# Patient Record
Sex: MALE | Race: WHITE | NOT HISPANIC OR LATINO | Employment: UNEMPLOYED | URBAN - METROPOLITAN AREA
[De-identification: names, ages, dates, MRNs, and addresses within clinical notes are randomized per-mention and may not be internally consistent; named-entity substitution may affect disease eponyms.]

---

## 2017-02-05 ENCOUNTER — HOSPITAL ENCOUNTER (EMERGENCY)
Facility: HOSPITAL | Age: 42
Discharge: HOME | End: 2017-02-05
Attending: EMERGENCY MEDICINE
Payer: COMMERCIAL

## 2017-02-05 VITALS
SYSTOLIC BLOOD PRESSURE: 141 MMHG | DIASTOLIC BLOOD PRESSURE: 94 MMHG | WEIGHT: 152 LBS | HEART RATE: 104 BPM | RESPIRATION RATE: 18 BRPM | HEIGHT: 68 IN | OXYGEN SATURATION: 98 % | TEMPERATURE: 99 F | BODY MASS INDEX: 23.04 KG/M2

## 2017-02-05 DIAGNOSIS — T22.132A: Primary | ICD-10-CM

## 2017-02-05 DIAGNOSIS — T22.232A: ICD-10-CM

## 2017-02-05 PROCEDURE — 25000003 PHARM REV CODE 250: Performed by: EMERGENCY MEDICINE

## 2017-02-05 PROCEDURE — 99283 EMERGENCY DEPT VISIT LOW MDM: CPT

## 2017-02-05 RX ORDER — IBUPROFEN 400 MG/1
800 TABLET ORAL
Status: COMPLETED | OUTPATIENT
Start: 2017-02-05 | End: 2017-02-05

## 2017-02-05 RX ADMIN — IBUPROFEN 800 MG: 400 TABLET, FILM COATED ORAL at 04:02

## 2017-02-05 NOTE — ED TRIAGE NOTES
"Pt arrives to ED via personal transportation with c/o burn to left arm and left hand pta, states "i was cooking a roast before i got here and the grease on the roast burned my left arm and left hand." Denies any other symptoms at this time.  "

## 2017-02-05 NOTE — ED AVS SNAPSHOT
[facility]  [place] [address]               Polo Jones   2017  4:27 PM   ED    Description:  Male : 1975   Department:  [facility]           Your Care was Coordinated By:     Provider Role From To    Gianluca Diaz MD Attending Provider 17 [id number] --      Reason for Visit     Burn           Diagnoses this Visit        Comments    Superficial burn of left upper arm    -  Primary     Partial thickness burn of left upper arm           ED Disposition     None           To Do List           Follow-up Information     Follow up with [facility]. Schedule an appointment as soon as possible for a visit in 1 week.    Why:  For wound re-check    Contact information:    [address] [facility] [address]  [phone]          Follow up with [facility].    Specialty:  Emergency Medicine    Why:  If symptoms worsen    Contact information:    [place] [id number] [phone]      [facility]     [facility] Nurse Care Line -  Assistance  Registered nurses in the [facility] provide clinical advisement, health education, appointment booking, and other advisory services.  Call for this free service at [phone].             Medications           Message regarding Medications     Verify the changes and/or additions to your medication regime listed below are the same as discussed with your clinician today.  If any of these changes or additions are incorrect, please notify your healthcare provider.        These medications were administered today        Dose Freq    ibuprofen tablet 800 mg 800 mg ED 1 Time    Sig: Take 2 tablets (800 mg total) by mouth ED 1 Time.    Class: Normal    Route: Oral           Verify that the below list of medications is an accurate representation of the medications you are currently taking.  If none reported, the  "list may be blank. If incorrect, please contact your healthcare provider. Carry this list with you in case of emergency.           Current Medications     ibuprofen tablet 800 mg Take 2 tablets (800 mg total) by mouth ED 1 Time.           Clinical Reference Information           Your Vitals Were     BP Pulse Temp Resp Height Weight    141/94 (BP Location: Right arm) 104 98.5 °F (36.9 °C) (Oral) 18 5' 8" (1.727 m) 68.9 kg (152 lb)    SpO2 BMI             98% 23.11 kg/m2         Allergies as of 2/5/2017     No Known Allergies      Immunizations Administered on Date of Encounter - 2/5/2017     None      ED Micro, Lab, POCT     None      ED Imaging Orders     None      Discharge References/Attachments     BURN, FIRST-DEGREE (ENGLISH)    BURN, SECOND-DEGREE (ENGLISH)      [facility] Sign-Up     Activating your [facility] account is as easy as 1-2-3!     1) Visit [email or web address], select Sign Up Now, enter this activation code and your date of birth, then select Next.  [email or web address]  Expires: 3/22/2017  4:36 PM      2) Create a username and password to use when you visit [facility] in the future and select a security question in case you lose your password and select Next.    3) Enter your e-mail address and click Sign Up!    Additional Information  If you have questions, please e-mail [email or web address] or call [phone] to talk to our [facility] staff. Remember, [facility] is NOT to be used for urgent needs. For medical emergencies, dial 911.          [facility] complies with applicable Federal civil rights laws and does not discriminate on the basis of race, color, national origin, age, disability, or sex.        Language Assistance Services     ATTENTION: Language assistance services are available, free of charge. Please call [phone].      ATENCIÓN: Si habla español, tiene a kelly disposición servicios gratuitos de asistencia lingüística. Llame al [phone].     CHÚ Ý: N?u b?n nói Ti?ng " Vi?t, có các d?ch v? h? tr? ngôn ng? mi?n phí dành cho b?n. G?i s? [phone].

## 2017-02-05 NOTE — ED PROVIDER NOTES
Encounter Date: 2/5/2017       History     Chief Complaint   Patient presents with    Burn     Pt reports buring left arm while cooking with hot grease.      Review of patient's allergies indicates:  No Known Allergies  HPI Comments: Patient presents immediately after a cooking accident.  Reports he dripped grease from a pot roast onto his left arm.  Reports he has burns from just below the shoulder extending down to his wrist.  Mostly on the volar aspect.  No loss of sensation.  It is painful.  He has noticed some blisters forming.  Reports his tetanus up-to-date from 2012.  Denies any other burns injuries or complaints.    The history is provided by the patient.     History reviewed. No pertinent past medical history.  No past medical history pertinent negatives.  History reviewed. No pertinent past surgical history.  History reviewed. No pertinent family history.  Social History   Substance Use Topics    Smoking status: Never Smoker    Smokeless tobacco: None    Alcohol use No     Review of Systems   Constitutional: Negative for activity change, appetite change, chills, fatigue and fever.   HENT: Negative for congestion, facial swelling, mouth sores, sore throat, tinnitus, trouble swallowing and voice change.    Eyes: Negative for pain, discharge and redness.   Respiratory: Negative for cough, chest tightness, shortness of breath and wheezing.    Cardiovascular: Negative for chest pain, palpitations and leg swelling.   Gastrointestinal: Negative for abdominal pain, diarrhea, nausea and vomiting.   Endocrine: Negative for polydipsia, polyphagia and polyuria.   Genitourinary: Negative for dysuria.   Musculoskeletal: Negative for back pain, gait problem, myalgias, neck pain and neck stiffness.   Skin: Positive for wound. Negative for rash.   Neurological: Negative for seizures, weakness, light-headedness and headaches.   Hematological: Does not bruise/bleed easily.   All other systems reviewed and are  negative.      Physical Exam   Initial Vitals   BP Pulse Resp Temp SpO2   02/05/17 1623 02/05/17 1623 02/05/17 1623 02/05/17 1623 02/05/17 1623   141/94 104 18 98.5 °F (36.9 °C) 98 %     Physical Exam    Nursing note and vitals reviewed.  Constitutional: He appears well-developed and well-nourished. He is not diaphoretic.   HENT:   Head: Normocephalic and atraumatic.   Right Ear: External ear normal.   Left Ear: External ear normal.   Nose: Nose normal.   Mouth/Throat: Oropharynx is clear and moist.   Eyes: Conjunctivae and EOM are normal. Pupils are equal, round, and reactive to light. Right eye exhibits no discharge. Left eye exhibits no discharge. No scleral icterus.   Neck: Normal range of motion.   Cardiovascular: Normal rate, regular rhythm, normal heart sounds and intact distal pulses.   No murmur heard.  Pulmonary/Chest: Breath sounds normal. No stridor. No respiratory distress. He has no wheezes. He has no rhonchi. He has no rales. He exhibits no tenderness.   Abdominal: Soft. He exhibits no distension. There is no tenderness. There is no rebound and no guarding.   Musculoskeletal: Normal range of motion. He exhibits no edema or tenderness.   Lymphadenopathy:     He has no cervical adenopathy.   Neurological: He is alert and oriented to person, place, and time. He has normal strength.   Skin: Skin is warm. Burn noted.        Psychiatric: He has a normal mood and affect. His behavior is normal. Judgment and thought content normal.         ED Course   Procedures  Labs Reviewed - No data to display          Medical Decision Making:   ED Management:  Well-appearing patient presents less than 30 minutes after sustaining a burn while cooking.  This does appear to be primarily superficial burn.  There are a few scattered blisters forming.  Good pink skin throughout.  None of it is insensate.  No circumferential burn.  Wrapped in gauze wetted for cooling.  Counseled to keep cleaned.  Tetanus is up-to-date.   Ibuprofen for pain.  Follow-up with primary care in 1 week for wound recheck.    KATELIN Diaz M.D.  02/05/2017  4:56 PM                     ED Course     Clinical Impression:   The primary encounter diagnosis was Superficial burn of left upper arm. A diagnosis of Partial thickness burn of left upper arm was also pertinent to this visit.          Gianluca Diaz MD  02/05/17 7835

## 2017-04-13 ENCOUNTER — OFFICE VISIT (OUTPATIENT)
Dept: INFECTIOUS DISEASES | Facility: CLINIC | Age: 42
End: 2017-04-13
Payer: COMMERCIAL

## 2017-04-13 ENCOUNTER — HOSPITAL ENCOUNTER (OUTPATIENT)
Dept: RADIOLOGY | Facility: HOSPITAL | Age: 42
Discharge: HOME | End: 2017-04-13
Attending: INTERNAL MEDICINE
Payer: COMMERCIAL

## 2017-04-13 VITALS
DIASTOLIC BLOOD PRESSURE: 96 MMHG | HEIGHT: 68 IN | SYSTOLIC BLOOD PRESSURE: 143 MMHG | HEART RATE: 90 BPM | TEMPERATURE: 98 F | WEIGHT: 151 LBS | BODY MASS INDEX: 22.88 KG/M2

## 2017-04-13 DIAGNOSIS — R06.00 DYSPNEA, UNSPECIFIED TYPE: ICD-10-CM
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
## 2017-04-13 DIAGNOSIS — S90.869S: Primary | ICD-10-CM

## 2017-04-13 PROCEDURE — 99205 OFFICE O/P NEW HI 60 MIN: CPT | Mod: S$GLB,,, | Performed by: INTERNAL MEDICINE

## 2017-04-13 PROCEDURE — 71020 XR CHEST PA AND LATERAL: CPT | Mod: 26,,, | Performed by: RADIOLOGY

## 2017-04-13 PROCEDURE — 71020 XR CHEST PA AND LATERAL: CPT | Mod: TC

## 2017-04-13 PROCEDURE — 99999 PR PBB SHADOW E&M-EST. PATIENT-LVL III: CPT | Mod: PBBFAC,,, | Performed by: INTERNAL MEDICINE

## 2017-04-13 NOTE — PROGRESS NOTES
"Subjective:      Patient ID: Polo Jones is a 41 y.o. male.    Chief Complaint:   Pt self referred for concern about Lyme disease      History of Present Illness    2013 had tick bite on foot while in [place]. He removed the ticks which were about 2-3 mm in diameter. About 3 mos later noted red rash on chest which persisted for 2 years and resolved spontaneously. He first saw his doctor (Gayathri Guerin)  in Aug 2013.  He requested [facility] lab test 10/21/13 for Lyme. Results:      IFA B burgdorferi G/A/M which was "equivocal". Western blot showed positive bands for 39 and 41 (also indeterminate)    Had [facility] tests done 12/4/13 showing normal / neg CMP, CBC, HBsAg, RPR, Lyme ab (IgG/IgM, babesia panel and hep C ab.    He says he has food allergies it combined eggs, milk and nuts which if eaten together causes wheezing, but not when eaten separately.  Also reports burn on his left forearm which he treated with topical silver sulfadiazine and worried that this may have hurt his immune system.    Other symptoms he has presently include tachycardia, chronic dyspnea (non-smoker, teen asthma hx) and a large right inguinal hernia. He recently completed an echocardiogram and Holter monitor, but they have not been resulted yet (Dr. Craig). No rash presently.    Review of Systems   Constitution: Negative for chills, decreased appetite, fever, weakness, malaise/fatigue, night sweats, weight gain and weight loss.   HENT: Negative for congestion, ear pain, headaches, hearing loss, hoarse voice, sore throat and tinnitus.    Eyes: Negative for blurred vision, redness and visual disturbance.   Cardiovascular: Positive for palpitations. Negative for chest pain and leg swelling.   Respiratory: Positive for shortness of breath. Negative for cough, hemoptysis and sputum production.    Hematologic/Lymphatic: Negative for adenopathy. Does not bruise/bleed easily.   Skin: Negative for dry skin, itching, rash and suspicious lesions. "   Musculoskeletal: Negative for back pain, joint pain, myalgias and neck pain.   Gastrointestinal: Negative for abdominal pain, constipation, diarrhea, heartburn, nausea and vomiting.   Genitourinary: Negative for dysuria, flank pain, frequency, hematuria, hesitancy and urgency.   Neurological: Negative for dizziness, numbness and paresthesias.   Psychiatric/Behavioral: Negative for depression and memory loss. The patient does not have insomnia and is not nervous/anxious.      Objective:   Physical Exam   Constitutional: He is oriented to person, place, and time. He appears well-developed and well-nourished.   HENT:   Head: Normocephalic and atraumatic.   Right Ear: External ear normal.   Left Ear: External ear normal.   Mouth/Throat: Oropharynx is clear and moist.   Eyes: Conjunctivae and EOM are normal. Pupils are equal, round, and reactive to light.   Neck: Normal range of motion. Neck supple. No thyromegaly present.   Cardiovascular: Normal rate, regular rhythm and normal heart sounds.    No murmur heard.  Pulmonary/Chest: Effort normal and breath sounds normal. He has no wheezes. He has no rales.   Abdominal: Soft. Bowel sounds are normal. He exhibits no mass. There is no tenderness. There is no rebound. A hernia is present.   Large right IH   Musculoskeletal: Normal range of motion.   Lymphadenopathy:     He has no cervical adenopathy.   No lymphadenopathy neck, axillae and inguinal   Neurological: He is alert and oriented to person, place, and time.   Skin: Skin is warm and dry.   Psychiatric:   Peculiar affect   Vitals reviewed.    Assessment:       1. Tick bite of foot, 2013 with indeterminate Lyme testing; clinical scenario not suggestive of Lyme disease   2. Dyspnea, unspecified type      3.   Palpitations     Plan:        1. Labs and related serologies ordered   2. Obtain echo and Holter results   3. Phone review after labs available

## 2017-04-13 NOTE — MR AVS SNAPSHOT
[facility] - Infectious Diseases  [address]  [facility] [address]  Phone: [phone]  Fax: [phone]                  Polo Soloriojohnna   2017 3:30 PM   Office Visit    Description:  Male : 1975   Provider:  Polo Biggs MD   Department:  [facility]           Diagnoses this Visit        Comments    Tick bite of foot, unspecified laterality, sequela    -  Primary     Dyspnea, unspecified type                To Do List           Future Appointments        Provider Department Dept Phone    2017 3:10 PM LAB, APPOINTMENT [place] [facility] [phone]    2017 3:30 PM Polo Biggs MD [facility] Infectious Diseases [phone]    2017 4:15 PM [facility] [id number] LIMIT [facility] [phone]      Goals (5 Years of Data)     None      [facility] On [facility]     [facility] Nurse Care Line -  Assistance  Unless otherwise directed by your provider, please contact [facility], our nurse care line that is available for  assistance.     Registered nurses in the [facility] provide: appointment scheduling, clinical advisement, health education, and other advisory services.  Call: [phone] (toll free)               Medications           Message regarding Medications     Verify the changes and/or additions to your medication regime listed below are the same as discussed with your clinician today.  If any of these changes or additions are incorrect, please notify your healthcare provider.             Verify that the below list of medications is an accurate representation of the medications you are currently taking.  If none reported, the list may be blank. If incorrect, please contact your healthcare provider. Carry this list with you in case of emergency.                Clinical Reference Information           Your Vitals Were     BP Pulse Temp Height Weight BMI    143/96 90 97.9 °F  "(36.6 °C) (Oral) 5' 8" (1.727 m) 68.5 kg (151 lb 0.2 oz) 22.96 kg/m2      Blood Pressure          Most Recent Value    BP  (!)  143/96      Allergies as of 4/13/2017     No Known Allergies      Immunizations Administered on Date of Encounter - 4/13/2017     None      Orders Placed During Today's Visit     Future Labs/Procedures Expected by Expires    BARTONELLA ANITBODY PANEL  4/13/2017 6/12/2018    C-reactive protein  4/13/2017 6/12/2018    CBC auto differential  4/13/2017 4/13/2018    Comprehensive metabolic panel  4/13/2017 4/13/2018    LYME DISEASE ANTIBODY BY EIA  4/13/2017 6/12/2018    LYME DISEASE WESTERN BLOT  4/13/2017 6/12/2018    Miscellaneous Sendout Test Blood  4/13/2017 6/12/2018    Sedimentation rate, manual  4/13/2017 6/12/2018    X-Ray Chest PA And Lateral  4/13/2017 4/13/2018      [facility] Sign-Up     Activating your [facility] account is as easy as 1-2-3!     1) Visit [email or web address], select Sign Up Now, enter this activation code and your date of birth, then select Next.  [email or web address]  Expires: 5/28/2017  3:08 PM      2) Create a username and password to use when you visit [facility] in the future and select a security question in case you lose your password and select Next.    3) Enter your e-mail address and click Sign Up!    Additional Information  If you have questions, please e-mail [email or web address] or call [phone] to talk to our [facility] staff. Remember, [facility] is NOT to be used for urgent needs. For medical emergencies, dial 911.         Language Assistance Services     ATTENTION: Language assistance services are available, free of charge. Please call [phone].      ATENCIÓN: Si habla español, tiene a kelly disposición servicios gratuitos de asistencia lingüística. Llame al [phone].     CHÚ Ý: N?u b?n nói Ti?ng Vi?t, có các d?ch v? h? tr? ngôn ng? mi?n phí dành cho b?n. G?i s? [phone].         [facility] complies with applicable Federal " civil rights laws and does not discriminate on the basis of race, color, national origin, age, disability, or sex.

## 2017-04-13 NOTE — LETTER
April 13, 2017      Jorge Craig MD  [address] [facility] [facility]  [place] [address]           [facility]ious [facility]  [address]  [facility] [address]  Phone: [phone]  Fax: [phone]          Patient: Polo Jones   MR Number: [id number]   YOB: 1975   Date of Visit: 4/13/2017       Dear Dr. Jorge Craig:    Thank you for referring Polo Jones to me for evaluation. Attached you will find relevant portions of my assessment and plan of care.    If you have questions, please do not hesitate to call me. I look forward to following Polo Jones along with you.    Sincerely,    Polo Biggs MD    Enclosure  CC:  No Recipients    If you would like to receive this communication electronically, please contact [email or web address] or [phone] to request more information on [facility] [employer] [facility] access.    For providers and/or their staff who would like to refer a patient to [facility], please contact us through our one-stop-shop provider referral line, [facility], at [phone].    If you feel you have received this communication in error or would no longer like to receive these types of communications, please e-mail [email or web address]

## 2017-04-19 ENCOUNTER — PATIENT MESSAGE (OUTPATIENT)
Dept: INFECTIOUS DISEASES | Facility: CLINIC | Age: 42
End: 2017-04-19

## 2017-07-03 ENCOUNTER — OFFICE VISIT (OUTPATIENT)
Dept: SURGERY | Facility: CLINIC | Age: 42
End: 2017-07-03
Payer: COMMERCIAL

## 2017-07-03 VITALS
HEIGHT: 68 IN | HEART RATE: 82 BPM | TEMPERATURE: 99 F | DIASTOLIC BLOOD PRESSURE: 93 MMHG | WEIGHT: 153.56 LBS | SYSTOLIC BLOOD PRESSURE: 156 MMHG | BODY MASS INDEX: 23.27 KG/M2

## 2017-07-03 DIAGNOSIS — K40.90 NON-RECURRENT UNILATERAL INGUINAL HERNIA WITHOUT OBSTRUCTION OR GANGRENE: Primary | ICD-10-CM

## 2017-07-03 DIAGNOSIS — D69.9 BLEEDING DISORDER: Primary | ICD-10-CM

## 2017-07-03 PROCEDURE — 99203 OFFICE O/P NEW LOW 30 MIN: CPT | Mod: S$GLB,,, | Performed by: SURGERY

## 2017-07-03 PROCEDURE — 99999 PR PBB SHADOW E&M-EST. PATIENT-LVL III: CPT | Mod: PBBFAC,,, | Performed by: SURGERY

## 2017-07-03 NOTE — PROGRESS NOTES
GENERAL SURGERY CLINIC NOTE    Polo Jones is a 42 y.o.  male with no significant past medical history who presents to clinic today for evaluation of right inguinal hernia. 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 It did not cause him discomfort initially but has gradually worsened. He reports that in April 2017 he began experiencing intermittent pain at the right groin site. Bulge worsened by valsalva during bowel movements but not cough. Does report baseline of daily bowel movements but has gone 2-3 days between bowel movements since April. Denies any relieving factors. No prior N/V, obstipation.    Of note, he does report that he has a personal history of spontaneous epistaxis of fairly large volume. He also reports significant bleeding with dental work (molar extraction, root canal) which prompted two different dentists to recommend hemophilia work up that he has not undergone.    No prior surgeries. No MI, CVA.      ROS:   Gen: No F/C, unintentional weight loss, night sweats, fatigue  Cardio: No chest pain, palpitations, syncope  Resp: No shortness of breath, cough, wheeze  GI: Endorses change in bowel habits; no abdominal pain, N/V, change in stool caliber or color, bleeding per rectum  : No dysuria, hematuria, frequency      Past Medical History:   Diagnosis Date    Asthma      No past surgical history on file.    Social History     Social History    Marital status: Single     Spouse name: N/A    Number of children: N/A    Years of education: N/A     Occupational History    Not on file.     Social History Main Topics    Smoking status: Never Smoker    Smokeless tobacco: Not on file    Alcohol use No    Drug use: No    Sexual activity: Not on file     Other Topics Concern    Not on file     Social History Narrative    No narrative on file     Review  of patient's allergies indicates:  No Known Allergies      PHYSICAL EXAM:  Vitals:    07/03/17 1101   BP: (!) 156/93   Pulse: 82   Temp: 98.8 °F (37.1 °C)       General: NAD  Neuro: AAOx3  Cardio: S1 and S2, RRR  Resp: CTAB, breathing even and unlabored  Abd: Soft, ND, NT  Groin: Visible right inguinal hernia, NT to touch, no overlying skin changes, reducible with some manipulation  Ext: Warm and well perfused      ASSESSMENT/PLAN:  42 y.o. male with reducible right inguinal hernia and possible bleeding disorder    - Ambulatory referral to Hematology for possible hemophilia work up  - Will proceed with elective right inguinal hernia repair after the above  - Risks, benefits, alternatives to the procedure discussed with the patient who wishes to proceed  - RTC following Hematology appointment to schedule elective surgery      Kole Choudhary MD  Surgery Resident, PGY-III  Pager: [phone]  7/3/2017 11:34 AM

## 2017-07-19 ENCOUNTER — INITIAL CONSULT (OUTPATIENT)
Dept: HEMATOLOGY/ONCOLOGY | Facility: CLINIC | Age: 42
End: 2017-07-19
Payer: COMMERCIAL

## 2017-07-19 VITALS
SYSTOLIC BLOOD PRESSURE: 118 MMHG | HEART RATE: 99 BPM | WEIGHT: 152.56 LBS | BODY MASS INDEX: 23.12 KG/M2 | HEIGHT: 68 IN | DIASTOLIC BLOOD PRESSURE: 86 MMHG | TEMPERATURE: 99 F | OXYGEN SATURATION: 96 %

## 2017-07-19 DIAGNOSIS — Z13.0 SCREENING FOR DISORDER OF BLOOD AND BLOOD-FORMING ORGANS: ICD-10-CM

## 2017-07-19 DIAGNOSIS — R04.0 FREQUENT NOSEBLEEDS: Primary | ICD-10-CM

## 2017-07-19 PROCEDURE — 99204 OFFICE O/P NEW MOD 45 MIN: CPT | Mod: S$GLB,,, | Performed by: INTERNAL MEDICINE

## 2017-07-19 PROCEDURE — 99999 PR PBB SHADOW E&M-EST. PATIENT-LVL III: CPT | Mod: PBBFAC,,, | Performed by: INTERNAL MEDICINE

## 2017-07-19 NOTE — PROGRESS NOTES
Subjective:       Patient ID: Polo Jones is a 42 y.o. male.    Chief Complaint: Consult (eval bleeding issues)    HPI   Reason For Consultation: Evaluate for Bleeding Issues  Referring Physician: Gianfranco Miles M.D.    Patient Is a 42-year-old Gentleman with past Medical History of Asthma Seen Today in Consultation for Evaluation for Bleeding Disorder.  He is accompanied by his mother today.  He has been overall good health and has not had routine medical care He reports that he underwent a dental procedure including the wisdom tooth extraction in the past and developed prolonged bleeding post procedure.  He reports he has had frequent nosebleeds since high school which she did not seek medical attention.  He reports that he become more frequent in the past couple of years.  He reports that the nosebleeds resolved with applied pressure he denies any prolonged bleedings with minor cuts.  No easy bruisability.  No family history of blood or bleeding disorders.  No family history of connective tissue disorder.  No alternative/complementary medicines.  No history of liver disease.  No surgeries in the past .  Surgical intervention involving an elective hernia repair is planned in the near future.  He is here for further evaluation.    FAMILY  HISTORY: Unremarkable for blood or bleeding disorders.  Dad with coronary artery disease Courneya artery disease status post CABG ×5.  Mom in overall good health    SOCIAL HISTORY: No history of tobacco abuse.  He does not drink.  He is employed as a  wild animals    Past Medical History:   Diagnosis Date    Asthma        History reviewed. No pertinent surgical history.    Review of Systems   Constitutional: Negative for appetite change, fatigue, fever and unexpected weight change.   HENT: Negative for mouth sores and nosebleeds.    Eyes: Negative for visual disturbance.   Respiratory: Negative for cough and shortness of breath.    Cardiovascular: Negative for  "chest pain and leg swelling.   Gastrointestinal: Negative for abdominal pain, blood in stool, constipation, diarrhea, nausea and vomiting.   Genitourinary: Negative for frequency and hematuria.   Musculoskeletal: Negative for arthralgias and back pain.   Skin: Negative for rash.   Neurological: Negative for dizziness, seizures, weakness, light-headedness, numbness and headaches.   Hematological: Negative for adenopathy.   Psychiatric/Behavioral: The patient is not nervous/anxious.        Objective:       Vitals:    07/19/17 1338   BP: 118/86   BP Location: Right arm   Patient Position: Sitting   BP Method: Manual   Pulse: 99   Temp: 98.6 °F (37 °C)   TempSrc: Oral   SpO2: 96%   Weight: 69.2 kg (152 lb 8.9 oz)   Height: 5' 8" (1.727 m)       Physical Exam   Constitutional: He is oriented to person, place, and time. He appears well-developed and well-nourished.   HENT:   Head: Normocephalic.   Mouth/Throat: Oropharynx is clear and moist. No oropharyngeal exudate.   Eyes: Conjunctivae are normal. Pupils are equal, round, and reactive to light. No scleral icterus.   Neck: Normal range of motion. Neck supple. No thyromegaly present.   Cardiovascular: Normal rate, regular rhythm and normal heart sounds.    No murmur heard.  Pulmonary/Chest: Effort normal and breath sounds normal. He has no wheezes. He has no rales.   Abdominal: Soft. Bowel sounds are normal. He exhibits no distension and no mass. There is no hepatosplenomegaly. There is no tenderness. There is no rebound and no guarding.   Musculoskeletal: Normal range of motion. He exhibits no edema.   Lymphadenopathy:     He has no cervical adenopathy.     He has no axillary adenopathy.        Right: No supraclavicular adenopathy present.        Left: No supraclavicular adenopathy present.   Neurological: He is alert and oriented to person, place, and time. No cranial nerve deficit.   Skin: No rash noted. No erythema.   Psychiatric: He has a normal mood and affect.     "   Labs:   Lab Results   Component Value Date    WBC 7.32 07/24/2017    HGB 16.5 07/24/2017    HCT 48.2 07/24/2017    MCV 94 07/24/2017     07/24/2017       Assessment:       1. Frequent nosebleeds    2. Screening for disorder of blood and blood-forming organs        Plan:   In summary, patient is a 42-year-old with with history of nosebleed frequent nosebleeds and possible bleeding disorder. Plan  lab testing including PT/PTT, CBC, platelet function assay and von Willebrand's panel testing.  Based on results of these findings he may need to undergo further evaluation if any of the coagulation screening studies prove to be abnormal. In addition, plan ambulatory referral to ENT for further evaluation of frequent nosebleeds.  All questions posed answered to patient's satisfaction    Thank you for allowing me to participate in the care of your patient    Cc: Gianfranco Miles MD

## 2017-07-19 NOTE — LETTER
July 22, 2017      Gianfranco Miles MD  [address]  [facility] [address]           [facility]  [address] [facility] [address] [place] [address]  Phone: [phone]          Patient: Polo Jones   MR Number: [id number]   YOB: 1975   Date of Visit: 7/19/2017       Dear Dr. Gianfranco Miles:    Thank you for referring Polo Jones to me for evaluation. Attached you will find relevant portions of my assessment and plan of care.    If you have questions, please do not hesitate to call me. I look forward to following Polo Jones along with you.    Sincerely,        Enclosure  CC:  No Recipients    If you would like to receive this communication electronically, please contact [email or web address] or [phone] to request more information on [employer] access.    For providers and/or their staff who would like to refer a patient to [facility], please contact us through our one-stop-shop provider referral line, [facility], at [phone].    If you feel you have received this communication in error or would no longer like to receive these types of communications, please e-mail [email or web address]

## 2017-07-24 ENCOUNTER — LAB VISIT (OUTPATIENT)
Dept: LAB | Facility: HOSPITAL | Age: 42
End: 2017-07-24
Attending: INTERNAL MEDICINE
Payer: COMMERCIAL

## 2017-07-24 DIAGNOSIS — R04.0 FREQUENT NOSEBLEEDS: ICD-10-CM

## 2017-07-24 LAB
ALBUMIN SERPL BCP-MCNC: 4.1 G/DL
ALP SERPL-CCNC: 98 U/L
ALT SERPL W/O P-5'-P-CCNC: 27 U/L
ANION GAP SERPL CALC-SCNC: 8 MMOL/L
APTT BLDCRRT: 29.9 SEC
AST SERPL-CCNC: 15 U/L
BASOPHILS # BLD AUTO: 0.02 K/UL
BASOPHILS NFR BLD: 0.3 %
BILIRUB SERPL-MCNC: 0.8 MG/DL
BUN SERPL-MCNC: 19 MG/DL
CALCIUM SERPL-MCNC: 9.2 MG/DL
CHLORIDE SERPL-SCNC: 104 MMOL/L
CO2 SERPL-SCNC: 27 MMOL/L
CREAT SERPL-MCNC: 1.3 MG/DL
DIFFERENTIAL METHOD: ABNORMAL
EOSINOPHIL # BLD AUTO: 0.2 K/UL
EOSINOPHIL NFR BLD: 2.6 %
ERYTHROCYTE [DISTWIDTH] IN BLOOD BY AUTOMATED COUNT: 13.6 %
EST. GFR  (AFRICAN AMERICAN): >60 ML/MIN/1.73 M^2
EST. GFR  (NON AFRICAN AMERICAN): >60 ML/MIN/1.73 M^2
GLUCOSE SERPL-MCNC: 99 MG/DL
HCT VFR BLD AUTO: 48.2 %
HGB BLD-MCNC: 16.5 G/DL
INR PPP: 1
LYMPHOCYTES # BLD AUTO: 1.4 K/UL
LYMPHOCYTES NFR BLD: 19.3 %
MCH RBC QN AUTO: 32 PG
MCHC RBC AUTO-ENTMCNC: 34.2 G/DL
MCV RBC AUTO: 94 FL
MONOCYTES # BLD AUTO: 0.6 K/UL
MONOCYTES NFR BLD: 7.5 %
NEUTROPHILS # BLD AUTO: 5.2 K/UL
NEUTROPHILS NFR BLD: 70.3 %
PLATELET # BLD AUTO: 227 K/UL
PLATELET FUNCTION ASSAY - EPINEPHRINE: 155 SECS
PMV BLD AUTO: 10.9 FL
POTASSIUM SERPL-SCNC: 4.1 MMOL/L
PROT SERPL-MCNC: 6.9 G/DL
PROTHROMBIN TIME: 10.7 SEC
RBC # BLD AUTO: 5.15 M/UL
SODIUM SERPL-SCNC: 139 MMOL/L
WBC # BLD AUTO: 7.32 K/UL

## 2017-07-24 PROCEDURE — 85576 BLOOD PLATELET AGGREGATION: CPT

## 2017-07-24 PROCEDURE — 85730 THROMBOPLASTIN TIME PARTIAL: CPT

## 2017-07-24 PROCEDURE — 36415 COLL VENOUS BLD VENIPUNCTURE: CPT

## 2017-07-24 PROCEDURE — 85397 CLOTTING FUNCT ACTIVITY: CPT

## 2017-07-24 PROCEDURE — 85025 COMPLETE CBC W/AUTO DIFF WBC: CPT

## 2017-07-24 PROCEDURE — 85610 PROTHROMBIN TIME: CPT

## 2017-07-24 PROCEDURE — 85246 CLOT FACTOR VIII VW ANTIGEN: CPT

## 2017-07-24 PROCEDURE — 80053 COMPREHEN METABOLIC PANEL: CPT

## 2017-07-25 LAB
VWF AG ACT/NOR PPP IA: 74 %
VWF:AC ACT/NOR PPP IA: 61 %

## 2017-08-02 ENCOUNTER — OFFICE VISIT (OUTPATIENT)
Dept: HEMATOLOGY/ONCOLOGY | Facility: CLINIC | Age: 42
End: 2017-08-02
Payer: COMMERCIAL

## 2017-08-02 ENCOUNTER — LAB VISIT (OUTPATIENT)
Dept: LAB | Facility: HOSPITAL | Age: 42
End: 2017-08-02
Attending: INTERNAL MEDICINE
Payer: COMMERCIAL

## 2017-08-02 VITALS
WEIGHT: 152.56 LBS | BODY MASS INDEX: 23.12 KG/M2 | HEIGHT: 68 IN | TEMPERATURE: 99 F | DIASTOLIC BLOOD PRESSURE: 84 MMHG | SYSTOLIC BLOOD PRESSURE: 132 MMHG | OXYGEN SATURATION: 96 % | HEART RATE: 79 BPM

## 2017-08-02 DIAGNOSIS — R04.0 FREQUENT NOSEBLEEDS: ICD-10-CM

## 2017-08-02 DIAGNOSIS — R04.0 FREQUENT NOSEBLEEDS: Primary | ICD-10-CM

## 2017-08-02 PROCEDURE — 85290 CLOT FACTOR XIII FIBRIN STAB: CPT

## 2017-08-02 PROCEDURE — 85670 THROMBIN TIME PLASMA: CPT

## 2017-08-02 PROCEDURE — 36415 COLL VENOUS BLD VENIPUNCTURE: CPT

## 2017-08-02 PROCEDURE — 3008F BODY MASS INDEX DOCD: CPT | Mod: S$GLB,,, | Performed by: INTERNAL MEDICINE

## 2017-08-02 PROCEDURE — 99999 PR PBB SHADOW E&M-EST. PATIENT-LVL III: CPT | Mod: PBBFAC,,, | Performed by: INTERNAL MEDICINE

## 2017-08-02 PROCEDURE — 99213 OFFICE O/P EST LOW 20 MIN: CPT | Mod: S$GLB,,, | Performed by: INTERNAL MEDICINE

## 2017-08-02 NOTE — PROGRESS NOTES
Subjective:       Patient ID: Polo Jones is a 42 y.o. male.    Chief Complaint: Follow-up    HPI     Diagnosis: Frequent nosebleeds      Patient Is a 42-year-old Gentleman with past Medical History of Asthma Seen Today in Consultation for Evaluation for Bleeding Disorder.  He is accompanied by his mother today.  He has been overall good health and has not had routine medical care He reports that he underwent a dental procedure including the wisdom tooth extraction in the past and developed prolonged bleeding post procedure.  He reports he has had frequent nosebleeds since high school which she did not seek medical attention.  He reports that he become more frequent in the past couple of years.  He reports that the nosebleeds resolved with applied pressure he denies any prolonged bleedings with minor cuts.  No easy bruisability.  No family history of blood or bleeding disorders.  No family history of connective tissue disorder.  No alternative/complementary medicines.  No history of liver disease.  No surgeries in the past .  Surgical intervention involving an elective hernia repair is planned in the near future.        Today, he has no new issues.  He reports episode of nosebleed since last visit  He is followed by ENT and cauterization planned in near future  No easy bruisability       FAMILY  HISTORY: Unremarkable for blood or bleeding disorders.  Dad with coronary artery disease Courneya artery disease status post CABG ×5.  Mom in overall good health    SOCIAL HISTORY: No history of tobacco abuse.  He does not drink.  He is employed as a  wild animals    Past Medical History:   Diagnosis Date    Asthma        History reviewed. No pertinent surgical history.    Review of Systems   Constitutional: Negative for appetite change, fatigue, fever and unexpected weight change.   HENT: Positive for nosebleeds. Negative for mouth sores.    Eyes: Negative for visual disturbance.   Respiratory: Negative for  "cough and shortness of breath.    Cardiovascular: Negative for chest pain and leg swelling.   Gastrointestinal: Negative for abdominal pain, blood in stool, constipation, diarrhea, nausea and vomiting.   Genitourinary: Negative for frequency and hematuria.   Musculoskeletal: Negative for arthralgias and back pain.   Skin: Negative for rash.   Neurological: Negative for dizziness, weakness, light-headedness and headaches.   Hematological: Negative for adenopathy.   Psychiatric/Behavioral: The patient is not nervous/anxious.        Objective:       Vitals:    08/02/17 1401   BP: 132/84   BP Location: Left arm   Patient Position: Sitting   BP Method: Automatic   Pulse: 79   Temp: 98.6 °F (37 °C)   TempSrc: Oral   SpO2: 96%   Weight: 69.2 kg (152 lb 8.9 oz)   Height: 5' 8" (1.727 m)       Physical Exam   Constitutional: He is oriented to person, place, and time. He appears well-developed and well-nourished.   HENT:   Head: Normocephalic.   Mouth/Throat: Oropharynx is clear and moist. No oropharyngeal exudate.   Eyes: Conjunctivae are normal. Pupils are equal, round, and reactive to light. No scleral icterus.   Neck: Normal range of motion. Neck supple. No thyromegaly present.   Cardiovascular: Normal rate, regular rhythm and normal heart sounds.    No murmur heard.  Pulmonary/Chest: Effort normal and breath sounds normal. He has no wheezes. He has no rales.   Abdominal: Soft. Bowel sounds are normal. He exhibits no distension and no mass. There is no hepatosplenomegaly. There is no tenderness. There is no rebound and no guarding.   Musculoskeletal: Normal range of motion. He exhibits no edema.   Lymphadenopathy:     He has no cervical adenopathy.     He has no axillary adenopathy.        Right: No supraclavicular adenopathy present.        Left: No supraclavicular adenopathy present.   Neurological: He is alert and oriented to person, place, and time. No cranial nerve deficit.   Skin: No rash noted. No erythema. "   Psychiatric: He has a normal mood and affect.       Labs:   Lab Results   Component Value Date    WBC 7.32 07/24/2017    HGB 16.5 07/24/2017    HCT 48.2 07/24/2017    MCV 94 07/24/2017     07/24/2017     Results for PETER GAMBINO (MRN [id number]) as of 8/2/2017 14:23   Ref. Range 7/24/2017 13:15   Protime Latest Ref Range: 9.0 - 12.5 sec 10.7   Coumadin Monitoring INR Latest Ref Range: 0.8 - 1.2  1.0   aPTT Latest Ref Range: 21.0 - 32.0 sec 29.9   Von Willebrand Ag Latest Ref Range: 55 - 200 % 74   Von Willebrand Factor Latest Ref Range: 55 - 200 % 61       Results for PETER GAMBINO (MRN [id number]) as of 8/2/2017 14:23   Ref. Range 7/24/2017 13:15   Platelet Function Assay - Epinephrine Latest Ref Range: 76 - 199 secs 155       Assessment:       1. Frequent nosebleeds        Plan:    42-year-old with with history of nosebleed frequent nosebleeds   He has undergone an extensive Hematological workup  Coags wnl  Blood counts wnl  vonWillebrands panel wnl  Factory 9 wnl  PFA ( plt function analyzer0 wnl  Plan  lab testing including thrombin time and Factor 13 level testing although suspicion for any abnormalities low  Workup to date indicates no evidence of bleeding diathesis.   He will follow-up with ENT    Pt will be notified of results of labs testing    All questions posed answered to patient's satisfaction    Cc: MD Hussein Payne MD

## 2017-08-04 LAB — THROMBIN TIME: 18 SEC

## 2017-08-10 ENCOUNTER — TELEPHONE (OUTPATIENT)
Dept: HEMATOLOGY/ONCOLOGY | Facility: CLINIC | Age: 42
End: 2017-08-10

## 2017-08-10 LAB — FACT XIIIA PPP-ACNC: 56 %

## 2017-08-10 NOTE — TELEPHONE ENCOUNTER
Pt is on [facility] financial assistance program, and will have to pay to see . Pt mother called asking can you referred pt to an [facility] doctor because  isnt. Pt mother understanding once response is given, will return call.   -D[facility]

## 2017-08-11 DIAGNOSIS — R04.0 FREQUENT NOSEBLEEDS: Primary | ICD-10-CM

## 2017-08-25 ENCOUNTER — OFFICE VISIT (OUTPATIENT)
Dept: HEMATOLOGY/ONCOLOGY | Facility: CLINIC | Age: 42
End: 2017-08-25
Payer: COMMERCIAL

## 2017-08-25 VITALS
OXYGEN SATURATION: 97 % | HEART RATE: 71 BPM | BODY MASS INDEX: 22.79 KG/M2 | SYSTOLIC BLOOD PRESSURE: 132 MMHG | DIASTOLIC BLOOD PRESSURE: 89 MMHG | HEIGHT: 68 IN | TEMPERATURE: 98 F | WEIGHT: 150.38 LBS

## 2017-08-25 DIAGNOSIS — R04.0 FREQUENT NOSEBLEEDS: Primary | ICD-10-CM

## 2017-08-25 PROCEDURE — 99999 PR PBB SHADOW E&M-EST. PATIENT-LVL III: CPT | Mod: PBBFAC,,, | Performed by: INTERNAL MEDICINE

## 2017-08-25 PROCEDURE — 3008F BODY MASS INDEX DOCD: CPT | Mod: S$GLB,,, | Performed by: INTERNAL MEDICINE

## 2017-08-25 PROCEDURE — 99213 OFFICE O/P EST LOW 20 MIN: CPT | Mod: S$GLB,,, | Performed by: INTERNAL MEDICINE

## 2017-08-25 NOTE — PROGRESS NOTES
Subjective:       Patient ID: Polo Jones is a 42 y.o. male.    Chief Complaint: Follow-up    HPI     Diagnosis: Frequent nosebleeds      Patient Is a 42-year-old Gentleman with past Medical History of Asthma Seen Today in Consultation for Evaluation for Bleeding Disorder.  He is accompanied by his mother today.  He has been overall good health and has not had routine medical care He reports that he underwent a dental procedure including the wisdom tooth extraction in the past and developed prolonged bleeding post procedure.  He reports he has had frequent nosebleeds since high school which she did not seek medical attention.  He reports that he become more frequent in the past couple of years.  He reports that the nosebleeds resolved with applied pressure he denies any prolonged bleedings with minor cuts.  No easy bruisability.  No family history of blood or bleeding disorders.  No family history of connective tissue disorder.  No alternative/complementary medicines.  No history of liver disease.  No surgeries in the past .  Surgical intervention involving an elective hernia repair is planned in the near future.        Today, he  reports episode of nosebleed since last visit   He reports nosebleed stopped with pressure   He is followed by ENT and cauterization planned in near future  Pt requesting referral to ENT at [facility] ( insurance )  No easy bruisability       FAMILY  HISTORY: Unremarkable for blood or bleeding disorders.  Dad with coronary artery disease Courneya artery disease status post CABG ×5.  Mom in overall good health    SOCIAL HISTORY: No history of tobacco abuse.  He does not drink.  He is employed as a  wild animals    Past Medical History:   Diagnosis Date    Asthma        History reviewed. No pertinent surgical history.    Review of Systems   Constitutional: Negative for appetite change, fatigue, fever and unexpected weight change.   HENT: Positive for nosebleeds. Negative for mouth  "sores.    Eyes: Negative for visual disturbance.   Respiratory: Negative for cough and shortness of breath.    Cardiovascular: Negative for chest pain and leg swelling.   Gastrointestinal: Negative for abdominal pain, blood in stool, constipation, diarrhea, nausea and vomiting.   Genitourinary: Negative for frequency.   Musculoskeletal: Negative for arthralgias and back pain.   Skin: Negative for rash.   Neurological: Negative for dizziness, weakness, light-headedness and headaches.   Hematological: Negative for adenopathy.   Psychiatric/Behavioral: The patient is not nervous/anxious.        Objective:       Vitals:    08/25/17 1006   BP: 132/89   BP Location: Right arm   Patient Position: Sitting   BP Method: Medium (Automatic)   Pulse: 71   Temp: 97.9 °F (36.6 °C)   TempSrc: Oral   SpO2: 97%   Weight: 68.2 kg (150 lb 5.7 oz)   Height: 5' 8" (1.727 m)       Physical Exam   Constitutional: He is oriented to person, place, and time. He appears well-developed and well-nourished.   HENT:   Head: Normocephalic.   Mouth/Throat: Oropharynx is clear and moist. No oropharyngeal exudate.   Eyes: Conjunctivae are normal. Pupils are equal, round, and reactive to light. No scleral icterus.   Neck: Normal range of motion. Neck supple. No thyromegaly present.   Cardiovascular: Normal rate, regular rhythm and normal heart sounds.    No murmur heard.  Pulmonary/Chest: Effort normal and breath sounds normal. He has no wheezes. He has no rales.   Abdominal: Soft. Bowel sounds are normal. He exhibits no distension and no mass. There is no hepatosplenomegaly. There is no tenderness. There is no rebound and no guarding.   Musculoskeletal: Normal range of motion. He exhibits no edema.   Lymphadenopathy:     He has no cervical adenopathy.     He has no axillary adenopathy.        Right: No supraclavicular adenopathy present.        Left: No supraclavicular adenopathy present.   Neurological: He is alert and oriented to person, place, and " time. No cranial nerve deficit.   Skin: No rash noted. No erythema.   Psychiatric: He has a normal mood and affect.       Labs:   Lab Results   Component Value Date    WBC 7.32 07/24/2017    HGB 16.5 07/24/2017    HCT 48.2 07/24/2017    MCV 94 07/24/2017     07/24/2017     Results for PETER GAMBINO (MRN [id number]) as of 8/2/2017 14:23   Ref. Range 7/24/2017 13:15   Protime Latest Ref Range: 9.0 - 12.5 sec 10.7   Coumadin Monitoring INR Latest Ref Range: 0.8 - 1.2  1.0   aPTT Latest Ref Range: 21.0 - 32.0 sec 29.9   Von Willebrand Ag Latest Ref Range: 55 - 200 % 74   Von Willebrand Factor Latest Ref Range: 55 - 200 % 61       Results for PETER GAMBINO (MRN [id number]) as of 8/2/2017 14:23   Ref. Range 7/24/2017 13:15   Platelet Function Assay - Epinephrine Latest Ref Range: 76 - 199 secs 155     Results for PETER GAMBINO (MRN [id number]) as of 8/25/2017 10:26   Ref. Range 8/2/2017 15:01   Thrombin Time Latest Ref Range: 15 - 23 sec 18   Factor XIII Screen Latest Ref Range: 60 - 150 % 56 (L)     Assessment:       1. Frequent nosebleeds        Plan:    42-year-old with with history of nosebleed frequent nosebleeds   He has undergone an extensive Hematological workup  Coags wnl  Blood counts wnl  vonWillebrands panel wnl  PFA ( plt function analyzer) wnl  Factor 13 level borderline low  Plan to repeat testing  Sig if Factor 13 level below 50  Workup to date indicates no definitive evidence of bleeding diathesis.   He will follow-up with ENT    Pt will be notified of results of labs testing    All questions posed answered to patient's satisfaction    Follow-up in 2mos    Cc: MD Hussein Payne MD

## 2017-08-28 ENCOUNTER — LAB VISIT (OUTPATIENT)
Dept: LAB | Facility: HOSPITAL | Age: 42
End: 2017-08-28
Attending: INTERNAL MEDICINE
Payer: COMMERCIAL

## 2017-08-28 DIAGNOSIS — R04.0 FREQUENT NOSEBLEEDS: ICD-10-CM

## 2017-08-28 PROCEDURE — 85250 CLOT FACTOR IX PTC/CHRSTMAS: CPT

## 2017-08-28 PROCEDURE — 85290 CLOT FACTOR XIII FIBRIN STAB: CPT

## 2017-08-28 PROCEDURE — 36415 COLL VENOUS BLD VENIPUNCTURE: CPT

## 2017-08-29 LAB — FACT IX ACT/NOR PPP: 95 %

## 2017-09-05 ENCOUNTER — TELEPHONE (OUTPATIENT)
Dept: HEMATOLOGY/ONCOLOGY | Facility: CLINIC | Age: 42
End: 2017-09-05

## 2017-09-05 ENCOUNTER — OFFICE VISIT (OUTPATIENT)
Dept: OTOLARYNGOLOGY | Facility: CLINIC | Age: 42
End: 2017-09-05
Payer: COMMERCIAL

## 2017-09-05 VITALS
BODY MASS INDEX: 23.23 KG/M2 | DIASTOLIC BLOOD PRESSURE: 87 MMHG | SYSTOLIC BLOOD PRESSURE: 126 MMHG | HEART RATE: 78 BPM | WEIGHT: 152.75 LBS

## 2017-09-05 DIAGNOSIS — R04.0 RECURRENT EPISTAXIS: Primary | ICD-10-CM

## 2017-09-05 LAB — FACT XIIIA PPP-ACNC: 91 %

## 2017-09-05 PROCEDURE — 99999 PR PBB SHADOW E&M-EST. PATIENT-LVL III: CPT | Mod: PBBFAC,,, | Performed by: OTOLARYNGOLOGY

## 2017-09-05 PROCEDURE — 99243 OFF/OP CNSLTJ NEW/EST LOW 30: CPT | Mod: 25,S$GLB,, | Performed by: OTOLARYNGOLOGY

## 2017-09-05 PROCEDURE — 30901 CONTROL OF NOSEBLEED: CPT | Mod: LT,S$GLB,, | Performed by: OTOLARYNGOLOGY

## 2017-09-05 NOTE — PROGRESS NOTES
Subjective:     Polo Jones is a 42 y.o. male who was referred to me by Dr. Gina Robles in consultation for nosebleeds.    He relates a recurrent history over the past year of intermittent nosebleeds, usually greater on the left side, that may occur unannounced several times per month.  These are usually controlled by brief pressure and resolve in 2 minutes or less.  He denies nasal blockage, postnasal drip or notable sinus infections.  He has not previously had nasal cauterization, though was recently considering this procedure under general anesthesia with Dr. Cast.  The bleeding has not required packing for control.  The last episode was several days ago, and is not currently active.  There is not a prior history of nasal surgery.  There is not a prior history of nasal trauma.  There is not a history of environmental allergies.  He does not currently use a nasal spray.  He has had an extensive workup for clotting factor deficiency which was ultimately unrevealing.  There is not a family history to suggest a clotting disorder.  He takes ibuprofen occasionally for minor aches.      Past Medical History  He has a past medical history of Asthma.    Past Surgical History  He has no past surgical history on file.    Family History  His family history includes Diabetes in his father and mother; Heart disease in his father; Hyperlipidemia in his father and mother; Hypertension in his father and mother.    Social History  He reports that he has never smoked. He has never used smokeless tobacco. He reports that he does not drink alcohol or use drugs.    Allergies  He has No Known Allergies.    Medications  He currently has no medications in their medication list.    Review of Systems   Constitutional: Negative for fatigue, fever and unexpected weight change.   HENT: Positive for nosebleeds. Negative for congestion, dental problem, ear discharge, ear pain, facial swelling, hearing loss, hoarse voice, postnasal  drip, rhinorrhea, sinus pressure, sore throat, tinnitus, trouble swallowing and voice change.    Eyes: Negative for photophobia, discharge, itching and visual disturbance.   Respiratory: Negative for apnea, cough, shortness of breath and wheezing.    Cardiovascular: Negative for chest pain and palpitations.   Gastrointestinal: Negative for abdominal pain, nausea and vomiting.   Endocrine: Negative for cold intolerance and heat intolerance.   Genitourinary: Negative for difficulty urinating.   Musculoskeletal: Negative for arthralgias, back pain, myalgias and neck pain.   Skin: Negative for rash.   Allergic/Immunologic: Negative for environmental allergies and food allergies.   Neurological: Negative for dizziness, seizures, syncope, weakness and headaches.   Hematological: Negative for adenopathy. Does not bruise/bleed easily.   Psychiatric/Behavioral: Negative for decreased concentration, dysphoric mood and sleep disturbance. The patient is not nervous/anxious.           Objective:     /87 (BP Location: Right arm)   Pulse 78   Wt 69.3 kg (152 lb 12.5 oz)   BMI 23.23 kg/m²      Constitutional:   He appears well-developed. He is cooperative. Normal speech.  No hoarse voice.      Head:  Normocephalic. Salivary glands normal.  Facial strength is normal.      Ears:    Right Ear: No drainage or tenderness. Tympanic membrane is not perforated. Tympanic membrane mobility is normal. No middle ear effusion. No decreased hearing is noted.   Left Ear: No drainage or tenderness. Tympanic membrane is not perforated. Tympanic membrane mobility is normal.  No middle ear effusion. No decreased hearing is noted.     Nose:  No mucosal edema, rhinorrhea, septal deviation or polyps. Epistaxis is observed. Turbinates normal, no turbinate masses and no turbinate hypertrophy.  Right sinus exhibits no maxillary sinus tenderness and no frontal sinus tenderness. Left sinus exhibits no maxillary sinus tenderness and no frontal sinus  tenderness.   Bleeding focus at left Little's area.  Right side with nonbleeding vessels.  No mass or ulceration.    Mouth/Throat  Oropharynx clear and moist without lesions or asymmetry and normal uvula midline. He does not have dentures. Normal dentition. No oral lesions or mucous membrane lesions. No oropharyngeal exudate or posterior oropharyngeal erythema. Mirror exam not performed due to patient tolerance.  Mirror exam not performed due to patient tolerance.      Neck:  Neck normal without thyromegaly masses, asymmetry, normal tracheal structure, crepitus, and tenderness, thyroid normal, trachea normal and no adenopathy. Normal range of motion present.     He has no cervical adenopathy.     Cardiovascular:   Regular rhythm.      Pulmonary/Chest:   Effort normal.     Psychiatric:   He has a normal mood and affect. His speech is normal and behavior is normal.     Neurological:   No cranial nerve deficit.     Skin:   No rash noted.       Procedure    Control of Epistaxis    Indication:  Epistaxis    Informed consent was obtained prior to proceeding.  The left nasal cavity was anesthetized with topical 4% lidocaine.  Bleeding was localized to the left nasal cavity in Little's area and cauterized with silver nitrate.       The patient tolerated the procedure well.      Data Reviewed    Hemoglobin (g/dL)   Date Value   07/24/2017 16.5     Hematocrit (%)   Date Value   07/24/2017 48.2     Platelets (K/uL)   Date Value   07/24/2017 227     Prothrombin Time (sec)   Date Value   07/24/2017 10.7     aPTT (sec)   Date Value   07/24/2017 29.9     INR (no units)   Date Value   07/24/2017 1.0            Assessment:     1. Recurrent epistaxis         Plan:     I had a long discussion with the patient and his mother regarding his condition and the further workup and management options.  I prescribed the daily use of nasal saline spray to prevent mucosal dessiccation.  We discussed the possible need for cauterization of the  right side after allowing for contralateral healing.    Return in about 2 weeks (around 9/19/2017).

## 2017-09-05 NOTE — LETTER
2017    Gina Robles MD  [address]  [address]  [place] [address]           OTOLARYNGOLOGY AND COMMUNICATION SCIENCES    Eulalio Gilliland MD, FACS          SURGICAL AND MEDICAL DISEASES OF HEAD AND NECK  MD Eulalio Pacheco MD, FACS  Vik Espinosa III, MD    OTOLOGY, NEUROTOLOGY and SKULL-BASE SURGERY  Mahamed Bell MD, FACS  Kelechi Bernabe MD, Director    PEDIATRIC OTOLARYNGOLOGY & OTOLOGY  KRYSTAL De Santiago MD, FAAP  Arabella Pelayo MD, FACS    FACIAL PLASTIC and RECONSTRUCTIVE SURGERY  MANDY Payne III, MD, FACS    RHINOLOGY and SINUS SURGERY  Jere Vizcaino MD, MPH, FACS  Director   MANDY Payne III, MD, FACS    LARYNGOLOGY  Sanchez Cooney MD    SPEECH-LANGUAGE PATHOLOGY  Sabrina Gottlieb, PhD, [facility]-SLP  Damari Cantrell, MS, CCC-SLP  Bessy Shrestha, MS, CCC-SLP  Dona Zavala MA, [facility]-SLP    AUDIOLOGY SECTION  Landy Chandler, MS, CCC-A  MARCELLUS Rodríguez, CCC-A  Ra Coronel, CCC-A  Ra Wtit, CCC-A  Remington Madrigal Jr., MARCELLUS, CCA-A  MARCELLUS Falcon, CCC-A  Ra Lobo, CCC-A    [facility]  Head and Neck Surgical Oncology  KATE Mayen, FNP-C  Pedatric Otolaryngology  KATE Stephen, PNP-C       Re:  Polo Jones  :  1975    Dear Dr. Robles,    Thank you for referring your patient, Polo Jones, to us for evaluation and treatment.  I have enclosed a copy of my clinic note for your review and records.  If you have any questions please do not hesitate to contact our office.     Thank you for allowing me to participate in the care of your patient.    Sincerely,        Jree Vizcaino MD, MPH, FACS    Director, Rhinology and Sinus Surgery  Department of Otorhinolaryngology  [facility]    Encl:  Progress note     [facility] [address] [address]  phone [phone]  fax [phone]   [email or web address]

## 2017-09-05 NOTE — TELEPHONE ENCOUNTER
----- Message from Gina Robles MD sent at 9/5/2017  2:36 PM CDT -----  Notify pt repeat FACTOR 13 level wnl range

## 2017-10-25 ENCOUNTER — OFFICE VISIT (OUTPATIENT)
Dept: HEMATOLOGY/ONCOLOGY | Facility: CLINIC | Age: 42
End: 2017-10-25
Payer: COMMERCIAL

## 2017-10-25 VITALS
BODY MASS INDEX: 23.19 KG/M2 | TEMPERATURE: 98 F | SYSTOLIC BLOOD PRESSURE: 129 MMHG | WEIGHT: 153 LBS | HEART RATE: 81 BPM | HEIGHT: 68 IN | DIASTOLIC BLOOD PRESSURE: 89 MMHG | OXYGEN SATURATION: 96 %

## 2017-10-25 DIAGNOSIS — R04.0 FREQUENT NOSEBLEEDS: ICD-10-CM

## 2017-10-25 PROCEDURE — 99213 OFFICE O/P EST LOW 20 MIN: CPT | Mod: S$GLB,,, | Performed by: INTERNAL MEDICINE

## 2017-10-25 PROCEDURE — 99999 PR PBB SHADOW E&M-EST. PATIENT-LVL III: CPT | Mod: PBBFAC,,, | Performed by: INTERNAL MEDICINE

## 2017-10-25 NOTE — PROGRESS NOTES
Subjective:       Patient ID: Polo Jones is a 42 y.o. male.    Chief Complaint: Follow-up    HPI     Diagnosis: Frequent nosebleeds      Patient Is a 42-year-old Gentleman with past Medical History of Asthma Seen Today in Consultation for Evaluation for Bleeding Disorder.  He is accompanied by his mother today.  He has been overall good health and has not had routine medical care He reports that he underwent a dental procedure including the wisdom tooth extraction in the past and developed prolonged bleeding post procedure.  He reports he has had frequent nosebleeds since high school which she did not seek medical attention.  He reports that he become more frequent in the past couple of years.  He reports that the nosebleeds resolved with applied pressure he denies any prolonged bleedings with minor cuts.  No easy bruisability.  No family history of blood or bleeding disorders.  No family history of connective tissue disorder.  No alternative/complementary medicines.  No history of liver disease.  No surgeries in the past .  Surgical intervention involving an elective hernia repair is planned in the near future.        Today, he  Is doing well  He is followed by ENT and s/p nasal cauterization,  No further nosebleeds  No easy bruisability     He has undergone an extensive Heme w/u - unremarkable    FAMILY  HISTORY: Unremarkable for blood or bleeding disorders.  Dad with coronary artery disease Cour[place] artery disease status post CABG ×5.  Mom in overall good health    SOCIAL HISTORY: No history of tobacco abuse.  He does not drink.  He is employed as a  wild animals    Past Medical History:   Diagnosis Date    Asthma        History reviewed. No pertinent surgical history.    Review of Systems   Constitutional: Negative for appetite change, fatigue, fever and unexpected weight change.   HENT: Positive for nosebleeds (resolved post cauterization). Negative for mouth sores.    Eyes: Negative for visual  "disturbance.   Respiratory: Negative for cough and shortness of breath.    Cardiovascular: Negative for chest pain and leg swelling.   Gastrointestinal: Negative for abdominal pain, blood in stool, constipation, diarrhea, nausea and vomiting.   Genitourinary: Negative for frequency.   Musculoskeletal: Negative for arthralgias and back pain.   Skin: Negative for rash.   Neurological: Negative for dizziness, weakness, light-headedness and headaches.   Hematological: Negative for adenopathy.   Psychiatric/Behavioral: The patient is not nervous/anxious.        Objective:       Vitals:    10/25/17 1338   BP: 129/89   BP Location: Right arm   Patient Position: Sitting   BP Method: Medium (Automatic)   Pulse: 81   Temp: 98.1 °F (36.7 °C)   TempSrc: Oral   SpO2: 96%   Weight: 69.4 kg (153 lb)   Height: 5' 8" (1.727 m)       Physical Exam   Constitutional: He is oriented to person, place, and time. He appears well-developed and well-nourished.   HENT:   Head: Normocephalic.   Mouth/Throat: Oropharynx is clear and moist. No oropharyngeal exudate.   Eyes: Conjunctivae are normal. Pupils are equal, round, and reactive to light. No scleral icterus.   Neck: Normal range of motion. Neck supple. No thyromegaly present.   Cardiovascular: Normal rate, regular rhythm and normal heart sounds.    No murmur heard.  Pulmonary/Chest: Effort normal and breath sounds normal. He has no wheezes. He has no rales.   Abdominal: Soft. Bowel sounds are normal. He exhibits no distension and no mass. There is no hepatosplenomegaly. There is no tenderness. There is no rebound and no guarding.   Musculoskeletal: Normal range of motion. He exhibits no edema.   Lymphadenopathy:     He has no cervical adenopathy.     He has no axillary adenopathy.        Right: No supraclavicular adenopathy present.        Left: No supraclavicular adenopathy present.   Neurological: He is alert and oriented to person, place, and time. No cranial nerve deficit.   Skin: No " rash noted. No erythema.   Psychiatric: He has a normal mood and affect.       Labs:   Lab Results   Component Value Date    WBC 7.32 07/24/2017    HGB 16.5 07/24/2017    HCT 48.2 07/24/2017    MCV 94 07/24/2017     07/24/2017     Results for PETER GAMBINO (MRN [id number]) as of 8/2/2017 14:23   Ref. Range 7/24/2017 13:15   Protime Latest Ref Range: 9.0 - 12.5 sec 10.7   Coumadin Monitoring INR Latest Ref Range: 0.8 - 1.2  1.0   aPTT Latest Ref Range: 21.0 - 32.0 sec 29.9   Von Willebrand Ag Latest Ref Range: 55 - 200 % 74   Von Willebrand Factor Latest Ref Range: 55 - 200 % 61       Results for PETER GAMBINO (MRN [id number]) as of 8/2/2017 14:23   Ref. Range 7/24/2017 13:15   Platelet Function Assay - Epinephrine Latest Ref Range: 76 - 199 secs 155     Results for PETER GAMBINO (MRN [id number]) as of 8/25/2017 10:26   Ref. Range 8/2/2017 15:01   Thrombin Time Latest Ref Range: 15 - 23 sec 18   Factor XIII Screen Latest Ref Range: 60 - 150 % 56 (L)     Results for PETER GAMBINO (MRN [id number]) as of 10/25/2017 14:04   Ref. Range 8/2/2017 15:01 8/28/2017 13:08   Thrombin Time Latest Ref Range: 15 - 23 sec 18    Factor IX Activity Latest Ref Range: 65 - 145 %  95   Factor XIII Screen Latest Ref Range: 60 - 150 % 56 (L) 91     Results for PETER GAMBINO (MRN [id number]) as of 10/25/2017 14:04   Ref. Range 7/24/2017 13:15   Protime Latest Ref Range: 9.0 - 12.5 sec 10.7   Coumadin Monitoring INR Latest Ref Range: 0.8 - 1.2  1.0   aPTT Latest Ref Range: 21.0 - 32.0 sec 29.9   Von Willebrand Ag Latest Ref Range: 55 - 200 % 74   Von Willebrand Factor Latest Ref Range: 55 - 200 % 61     Assessment:       1. Frequent nosebleeds        Plan:    42-year-old with with history of nosebleed frequent nosebleeds   He has undergone an extensive Hematological workup- unremarkable  S/p nasal cauterization  Coags wnl  Blood counts wnl  vonWillebrands panel wnl  PFA ( plt function analyzer) wnl  Factor 13 level borderline low  REPEAT  TESTING F13 WNL   Workup to date indicates no definitive evidence of bleeding diathesis.   He will follow-up with ENT    He is cleared from a Hematology standpoint to undergo planned surgery    All questions posed answered to patient's satisfaction    Follow-up prn        Cc: MD Hussein Payne MD

## 2017-11-06 DIAGNOSIS — K40.90 RIGHT INGUINAL HERNIA: Primary | ICD-10-CM

## 2017-11-08 ENCOUNTER — OFFICE VISIT (OUTPATIENT)
Dept: SURGERY | Facility: CLINIC | Age: 42
End: 2017-11-08
Payer: COMMERCIAL

## 2017-11-08 ENCOUNTER — LAB VISIT (OUTPATIENT)
Dept: LAB | Facility: HOSPITAL | Age: 42
End: 2017-11-08
Attending: SURGERY
Payer: COMMERCIAL

## 2017-11-08 VITALS
BODY MASS INDEX: 23.23 KG/M2 | HEIGHT: 68 IN | DIASTOLIC BLOOD PRESSURE: 86 MMHG | HEART RATE: 83 BPM | SYSTOLIC BLOOD PRESSURE: 145 MMHG | WEIGHT: 153.31 LBS | TEMPERATURE: 98 F

## 2017-11-08 DIAGNOSIS — K40.90 RIGHT INGUINAL HERNIA: ICD-10-CM

## 2017-11-08 DIAGNOSIS — K40.90 NON-RECURRENT UNILATERAL INGUINAL HERNIA WITHOUT OBSTRUCTION OR GANGRENE: Primary | ICD-10-CM

## 2017-11-08 LAB
ANION GAP SERPL CALC-SCNC: 8 MMOL/L
BASOPHILS # BLD AUTO: 0.04 K/UL
BASOPHILS NFR BLD: 0.6 %
BUN SERPL-MCNC: 14 MG/DL
CALCIUM SERPL-MCNC: 9.8 MG/DL
CHLORIDE SERPL-SCNC: 103 MMOL/L
CO2 SERPL-SCNC: 29 MMOL/L
CREAT SERPL-MCNC: 1 MG/DL
DIFFERENTIAL METHOD: ABNORMAL
EOSINOPHIL # BLD AUTO: 0.2 K/UL
EOSINOPHIL NFR BLD: 2.9 %
ERYTHROCYTE [DISTWIDTH] IN BLOOD BY AUTOMATED COUNT: 13.8 %
EST. GFR  (AFRICAN AMERICAN): >60 ML/MIN/1.73 M^2
EST. GFR  (NON AFRICAN AMERICAN): >60 ML/MIN/1.73 M^2
GLUCOSE SERPL-MCNC: 91 MG/DL
HCT VFR BLD AUTO: 50.6 %
HGB BLD-MCNC: 17.2 G/DL
IMM GRANULOCYTES # BLD AUTO: 0.02 K/UL
IMM GRANULOCYTES NFR BLD AUTO: 0.3 %
LYMPHOCYTES # BLD AUTO: 1.3 K/UL
LYMPHOCYTES NFR BLD: 18 %
MCH RBC QN AUTO: 31.7 PG
MCHC RBC AUTO-ENTMCNC: 34 G/DL
MCV RBC AUTO: 93 FL
MONOCYTES # BLD AUTO: 0.6 K/UL
MONOCYTES NFR BLD: 8.3 %
NEUTROPHILS # BLD AUTO: 5 K/UL
NEUTROPHILS NFR BLD: 69.9 %
NRBC BLD-RTO: 0 /100 WBC
PLATELET # BLD AUTO: 230 K/UL
PMV BLD AUTO: 11.5 FL
POTASSIUM SERPL-SCNC: 4.3 MMOL/L
RBC # BLD AUTO: 5.43 M/UL
SODIUM SERPL-SCNC: 140 MMOL/L
WBC # BLD AUTO: 7.13 K/UL

## 2017-11-08 PROCEDURE — 99213 OFFICE O/P EST LOW 20 MIN: CPT | Mod: S$GLB,,, | Performed by: SURGERY

## 2017-11-08 PROCEDURE — 99999 PR PBB SHADOW E&M-EST. PATIENT-LVL III: CPT | Mod: PBBFAC,,, | Performed by: SURGERY

## 2017-11-08 PROCEDURE — 85025 COMPLETE CBC W/AUTO DIFF WBC: CPT

## 2017-11-08 PROCEDURE — 36415 COLL VENOUS BLD VENIPUNCTURE: CPT

## 2017-11-08 PROCEDURE — 80048 BASIC METABOLIC PNL TOTAL CA: CPT

## 2017-11-08 NOTE — PROGRESS NOTES
GENERAL SURGERY CLINIC NOTE    Polo Jones is a 42 y.o.  male with no significant past medical history who presents to clinic today for evaluation of right inguinal hernia. 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 It did not cause him discomfort initially but has gradually worsened. He reports that in April 2017 he began experiencing intermittent pain at the right groin site. Bulge worsened by valsalva during bowel movements but not cough. Does report baseline of daily bowel movements but has gone 2-3 days between bowel movements since April. Denies any relieving factors. No prior N/V, obstipation.    Of note, he does report that he has a personal history of spontaneous epistaxis of fairly large volume. He also reports significant bleeding with dental work (molar extraction, root canal) which prompted two different dentists to recommend hemophilia work up that he has not undergone.    Since the patient's previous visit, he has undergone workup by Hematology and found to have no identifiable bleeding diathesis. They have cleared him for surgery.      No prior surgeries. No MI, CVA.      ROS:   Gen: No F/C, unintentional weight loss, night sweats, fatigue  Cardio: No chest pain, palpitations, syncope  Resp: No shortness of breath, cough, wheeze  GI: Endorses change in bowel habits; no abdominal pain, N/V, change in stool caliber or color, bleeding per rectum  : No dysuria, hematuria, frequency      Past Medical History:   Diagnosis Date    Asthma      No past surgical history on file.    Social History     Social History    Marital status: Single     Spouse name: N/A    Number of children: N/A    Years of education: N/A     Occupational History    Not on file.     Social History Main Topics    Smoking status: Never Smoker    Smokeless tobacco: Never Used     Alcohol use No    Drug use: No    Sexual activity: Not on file     Other Topics Concern    Not on file     Social History Narrative    No narrative on file     Review of patient's allergies indicates:  No Known Allergies      PHYSICAL EXAM:  Vitals:    11/08/17 1435   BP: (!) 145/86   Pulse: 83   Temp: 98.2 °F (36.8 °C)       General: NAD  Neuro: AAOx3  Cardio: S1 and S2, RRR  Resp: CTAB, breathing even and unlabored  Abd: Soft, ND, NT  Groin: Visible right inguinal hernia, NT to touch, no overlying skin changes, reducible with some manipulation  Ext: Warm and well perfused      ASSESSMENT/PLAN:  42 y.o. male with reducible right inguinal hernia; negative Hematology workup    - Will proceed with elective right inguinal hernia repair after the above  - Risks, benefits, alternatives to the procedure discussed with the patient who wishes to proceed  - Consent obtained

## 2017-11-08 NOTE — PATIENT INSTRUCTIONS
What Is a Hernia?    A hernia is when an organ or tissue pushes through a weak area in the belly (abdominal) wall. This weak area may be present at birth. Or it may be caused by abdominal strain over time. If not treated, a hernia can get worse with time and physical stress.  When a bulge forms  When there is a weak area in the abdominal wall, an organ or tissue can push outward. This often causes a bulge that you can see under your skin. The bulge may get bigger when you stand up. It may go away when you lie down. You may also feel some pressure or mild pain when lifting, coughing, urinating, or doing other activities.  Types of hernias  The type of hernia you have depends on its location. Most hernias form in the groin at or near the internal ring. This is the entrance to a canal between the abdomen and groin. Hernias can also occur in the abdomen, thigh, or genitals.  · An incisional hernia occurs at the site of a previous surgical incision.  · An umbilical hernia occurs at the navel.  · An indirect inguinal hernia occurs in the groin at the internal ring.  · A direct inguinal hernia occurs in the groin near the internal ring.  · A femoral hernia occurs just below the groin.  · An epigastric hernia occurs in the upper abdomen at the midline.  Diagnosis  In most cases, your healthcare provider can diagnose a hernia by doing a physical exam.  In some cases it might not be clear why you have a swelling in the belly wall. Then your provider may order an imaging test such as an ultrasound. This can help with the diagnosis.  Surgery  A hernia will not heal on its own. Surgery is needed to fix the weak spot in the abdominal wall. If not treated, a hernia can get larger. It can also cause serious health problems. The good news is that hernia surgery can be done quickly and safely. In some cases, you can go home the same day as your surgery.   When to call your provider  Call your healthcare provider right away if the  swelling around your hernia becomes larger, firmer, or more painful. These may be signs that your intestines are stuck in the abdominal wall. This is an emergency. The hernia must be repaired right away to avoid serious problems.  Date Last Reviewed: 7/1/2016  © 9454-2086 [employer]. [address] [place] [address]. All rights reserved. This information is not intended as a substitute for professional medical care. Always follow your healthcare professional's instructions.        How a Hernia Develops  Although a hernia bulge may appear suddenly, hernias often take years to develop. They grow larger as pressure inside the body presses the intestines or other tissues out through a weak area in the abdominal wall, often at the belly button or a site of previous surgery. With time, these tissues can bulge out beneath the skin.  Stages of hernia development    The wall weakens or tears. The abdominal lining bulges out through a weak area and begins to form a hernia sac. The sac may contain fat, intestine, or other tissues. At this point, the hernia may or may not cause a visible bulge.        The intestine pushes into the sac. As the intestine pushes further into the sac, it forms a visible bulge. The bulge may flatten when you lie down or push against it. This is called a reducible hernia and does not cause any immediate danger.             The intestine may become trapped. The sac containing the intestine may become trapped by muscle (incarcerated). If this happens, you wont be able to flatten the bulge. You may also have pain. Prompt treatment is needed.        The intestine may become strangulated. If the intestine is tightly trapped, it becomes strangulated. The strangulated area loses blood supply and may die. This can cause severe pain and block the intestine. Emergency surgery is needed.   Date Last Reviewed: 8/1/2016  © 9627-5710 [employer]. [address] [place] [address],  [place] [address]. All rights reserved. This information is not intended as a substitute for professional medical care. Always follow your healthcare professional's instructions.        Having Hernia Surgery: Patch Repair  Surgery treats a hernia by repairing the weakness in the abdominal wall. An incision is made so the surgeon has a direct view of the hernia. The repair is then done through this incision (open surgery). To repair the defect, special mesh materials are used to patch the weak area and make a tension-free repair. Follow your healthcare providers advice on how to get ready for the procedure. You can usually go home the same day as your surgery. In some cases, though, you may need to stay in the hospital overnight.  Getting ready for surgery  Your healthcare provider will talk with you about preparing for surgery. Follow all the instructions youre given and be sure to:   · Tell your healthcare provider about any medicines, supplements, or herbs you take. This includes both prescription and over-the-counter items.  · Stop taking aspirin, ibuprofen, naproxen and other NSAIDs as directed.  · Arrange for an adult family member or friend to give you a ride home after surgery.  · Stop smoking. Smoking affects blood flow and can slow healing.  · Gently wash the surgical area the night before surgery.  · Follow any directions you are given for not eating or drinking before surgery.     Repair in Front           Repair in Back           Combination Repair      The day of surgery  Arrive at the hospital or surgical center at your scheduled time. Youll be asked to change into a patient gown. Youll then be given an IV to provide fluids and medicine. Shortly before surgery, an anesthesiologist or nurse anesthetist will talk with you. He or she will explain the types of anesthesia used to prevent pain during surgery. You will have one or more of the following:  · Monitored sedation to make you relaxed and  sleepy.  · Local anesthesia to numb the surgical site.  · Regional anesthesia to numb specific areas of your body.  · General anesthesia to let you sleep during surgery.  During the surgery  Most hernias are treated using tension-free repairs. This is surgery that uses special mesh materials to repair the weak area. Unlike traditional repairs, the abdominal fascia (tissue around the muscle that gives strength to the abdominal wall) isnt sewn together. Instead, the mesh covers the weak area like a patch. This repairs the defect without tension on the muscles. It also makes it less likely to happen again. The mesh is made of strong, flexible plastic that stays in the body. Over time, nearby tissues grow into the mesh to strengthen the repair.  After surgery  When the procedure is over, youll be taken to the recovery area to rest. Your blood pressure and heart rate will be monitored. Youll also have a bandage over the surgical site. To help reduce discomfort, youll be given pain medicines. You may also be given breathing exercises to keep your lungs clear. Later, youll be asked to get up and walk. This helps prevent blood clots in the legs. You can go home when your healthcare provider says youre ready.     Risks and complications  Hernia surgery is safe, but does have risks including:  · Bleeding  · Infection  · Anesthesia risks  · Mesh complications  · Inability to urinate   · Bowel or bladder injury   · Numbness or pain in the groin or leg  · Risk the hernia will happen again  · Damage to the testicles or testicular function      Date Last Reviewed: 10/1/2016  © 9127-8392 [employer] [address] [place] [address], [place] [address]. All rights reserved. This information is not intended as a substitute for professional medical care. Always follow your healthcare professional's instructions.

## 2017-11-10 DIAGNOSIS — K40.90 RIGHT INGUINAL HERNIA: ICD-10-CM

## 2017-11-10 RX ORDER — SODIUM CHLORIDE 9 MG/ML
INJECTION, SOLUTION INTRAVENOUS CONTINUOUS
Status: CANCELLED | OUTPATIENT
Start: 2017-11-10

## 2017-11-14 ENCOUNTER — TELEPHONE (OUTPATIENT)
Dept: SURGERY | Facility: CLINIC | Age: 42
End: 2017-11-14

## 2017-11-15 ENCOUNTER — ANESTHESIA (OUTPATIENT)
Dept: SURGERY | Facility: HOSPITAL | Age: 42
End: 2017-11-15
Payer: COMMERCIAL

## 2017-11-15 ENCOUNTER — HOSPITAL ENCOUNTER (OUTPATIENT)
Facility: HOSPITAL | Age: 42
Discharge: HOME | End: 2017-11-15
Attending: SURGERY | Admitting: SURGERY
Payer: COMMERCIAL

## 2017-11-15 ENCOUNTER — SURGERY (OUTPATIENT)
Age: 42
End: 2017-11-15

## 2017-11-15 ENCOUNTER — ANESTHESIA EVENT (OUTPATIENT)
Dept: SURGERY | Facility: HOSPITAL | Age: 42
End: 2017-11-15
Payer: COMMERCIAL

## 2017-11-15 VITALS
HEIGHT: 68 IN | SYSTOLIC BLOOD PRESSURE: 137 MMHG | DIASTOLIC BLOOD PRESSURE: 79 MMHG | RESPIRATION RATE: 18 BRPM | HEART RATE: 100 BPM | WEIGHT: 153 LBS | TEMPERATURE: 98 F | BODY MASS INDEX: 23.19 KG/M2 | OXYGEN SATURATION: 100 %

## 2017-11-15 DIAGNOSIS — K40.90 RIGHT INGUINAL HERNIA: Primary | ICD-10-CM

## 2017-11-15 PROCEDURE — D9220A PRA ANESTHESIA: Mod: ANES,,, | Performed by: ANESTHESIOLOGY

## 2017-11-15 PROCEDURE — D9220A PRA ANESTHESIA: Mod: CRNA,,, | Performed by: NURSE ANESTHETIST, CERTIFIED REGISTERED

## 2017-11-15 PROCEDURE — 25000003 PHARM REV CODE 250: Performed by: PHYSICIAN ASSISTANT

## 2017-11-15 PROCEDURE — 36000706: Performed by: SURGERY

## 2017-11-15 PROCEDURE — 71000015 HC POSTOP RECOV 1ST HR: Performed by: SURGERY

## 2017-11-15 PROCEDURE — 37000008 HC ANESTHESIA 1ST 15 MINUTES: Performed by: SURGERY

## 2017-11-15 PROCEDURE — 49505 PRP I/HERN INIT REDUC >5 YR: CPT | Mod: RT,,, | Performed by: SURGERY

## 2017-11-15 PROCEDURE — 63600175 PHARM REV CODE 636 W HCPCS: Performed by: NURSE ANESTHETIST, CERTIFIED REGISTERED

## 2017-11-15 PROCEDURE — 27000221 HC OXYGEN, UP TO 24 HOURS

## 2017-11-15 PROCEDURE — 88302 TISSUE EXAM BY PATHOLOGIST: CPT | Performed by: PATHOLOGY

## 2017-11-15 PROCEDURE — 25000003 PHARM REV CODE 250: Performed by: STUDENT IN AN ORGANIZED HEALTH CARE EDUCATION/TRAINING PROGRAM

## 2017-11-15 PROCEDURE — 25000003 PHARM REV CODE 250: Performed by: ANESTHESIOLOGY

## 2017-11-15 PROCEDURE — 37000009 HC ANESTHESIA EA ADD 15 MINS: Performed by: SURGERY

## 2017-11-15 PROCEDURE — 63600175 PHARM REV CODE 636 W HCPCS: Performed by: PHYSICIAN ASSISTANT

## 2017-11-15 PROCEDURE — 36000707: Performed by: SURGERY

## 2017-11-15 PROCEDURE — 71000033 HC RECOVERY, INTIAL HOUR: Performed by: SURGERY

## 2017-11-15 PROCEDURE — 25000003 PHARM REV CODE 250: Performed by: NURSE ANESTHETIST, CERTIFIED REGISTERED

## 2017-11-15 PROCEDURE — 71000016 HC POSTOP RECOV ADDL HR: Performed by: SURGERY

## 2017-11-15 PROCEDURE — S0020 INJECTION, BUPIVICAINE HYDRO: HCPCS | Performed by: SURGERY

## 2017-11-15 PROCEDURE — 25000003 PHARM REV CODE 250: Performed by: SURGERY

## 2017-11-15 PROCEDURE — C1781 MESH (IMPLANTABLE): HCPCS | Performed by: SURGERY

## 2017-11-15 DEVICE — MESH PROGRIP RIGHT: Type: IMPLANTABLE DEVICE | Site: INGUINAL | Status: FUNCTIONAL

## 2017-11-15 RX ORDER — HYDROCODONE BITARTRATE AND ACETAMINOPHEN 5; 325 MG/1; MG/1
1 TABLET ORAL ONCE AS NEEDED
Status: COMPLETED | OUTPATIENT
Start: 2017-11-15 | End: 2017-11-15

## 2017-11-15 RX ORDER — SODIUM CHLORIDE 9 MG/ML
INJECTION, SOLUTION INTRAVENOUS CONTINUOUS
Status: DISCONTINUED | OUTPATIENT
Start: 2017-11-15 | End: 2017-11-15 | Stop reason: HOSPADM

## 2017-11-15 RX ORDER — HYDROCODONE BITARTRATE AND ACETAMINOPHEN 5; 325 MG/1; MG/1
1-2 TABLET ORAL
Qty: 21 TABLET | Refills: 0 | Status: SHIPPED | OUTPATIENT
Start: 2017-11-15 | End: 2017-11-28

## 2017-11-15 RX ORDER — DEXAMETHASONE SODIUM PHOSPHATE 4 MG/ML
INJECTION, SOLUTION INTRA-ARTICULAR; INTRALESIONAL; INTRAMUSCULAR; INTRAVENOUS; SOFT TISSUE
Status: DISCONTINUED | OUTPATIENT
Start: 2017-11-15 | End: 2017-11-15

## 2017-11-15 RX ORDER — GLYCOPYRROLATE 0.2 MG/ML
INJECTION INTRAMUSCULAR; INTRAVENOUS
Status: DISCONTINUED | OUTPATIENT
Start: 2017-11-15 | End: 2017-11-15

## 2017-11-15 RX ORDER — MIDAZOLAM HYDROCHLORIDE 1 MG/ML
INJECTION, SOLUTION INTRAMUSCULAR; INTRAVENOUS
Status: DISCONTINUED | OUTPATIENT
Start: 2017-11-15 | End: 2017-11-15

## 2017-11-15 RX ORDER — FENTANYL CITRATE 50 UG/ML
INJECTION, SOLUTION INTRAMUSCULAR; INTRAVENOUS
Status: DISCONTINUED | OUTPATIENT
Start: 2017-11-15 | End: 2017-11-15

## 2017-11-15 RX ORDER — PROPOFOL 10 MG/ML
VIAL (ML) INTRAVENOUS
Status: DISCONTINUED | OUTPATIENT
Start: 2017-11-15 | End: 2017-11-15

## 2017-11-15 RX ORDER — PROMETHAZINE HYDROCHLORIDE 25 MG/ML
INJECTION, SOLUTION INTRAMUSCULAR; INTRAVENOUS
Status: DISCONTINUED | OUTPATIENT
Start: 2017-11-15 | End: 2017-11-15

## 2017-11-15 RX ORDER — ONDANSETRON 2 MG/ML
INJECTION INTRAMUSCULAR; INTRAVENOUS
Status: DISCONTINUED | OUTPATIENT
Start: 2017-11-15 | End: 2017-11-15

## 2017-11-15 RX ORDER — BUPIVACAINE HYDROCHLORIDE 5 MG/ML
INJECTION, SOLUTION EPIDURAL; INTRACAUDAL
Status: DISCONTINUED | OUTPATIENT
Start: 2017-11-15 | End: 2017-11-15 | Stop reason: HOSPADM

## 2017-11-15 RX ORDER — OXYCODONE HYDROCHLORIDE 5 MG/1
10 TABLET ORAL ONCE
Status: COMPLETED | OUTPATIENT
Start: 2017-11-15 | End: 2017-11-15

## 2017-11-15 RX ORDER — LIDOCAINE HCL/PF 100 MG/5ML
SYRINGE (ML) INTRAVENOUS
Status: DISCONTINUED | OUTPATIENT
Start: 2017-11-15 | End: 2017-11-15

## 2017-11-15 RX ORDER — NEOSTIGMINE METHYLSULFATE 1 MG/ML
INJECTION, SOLUTION INTRAVENOUS
Status: DISCONTINUED | OUTPATIENT
Start: 2017-11-15 | End: 2017-11-15

## 2017-11-15 RX ORDER — ROCURONIUM BROMIDE 10 MG/ML
INJECTION, SOLUTION INTRAVENOUS
Status: DISCONTINUED | OUTPATIENT
Start: 2017-11-15 | End: 2017-11-15

## 2017-11-15 RX ADMIN — PROPOFOL 160 MG: 10 INJECTION, EMULSION INTRAVENOUS at 08:11

## 2017-11-15 RX ADMIN — GLYCOPYRROLATE 0.4 MG: 0.2 INJECTION, SOLUTION INTRAMUSCULAR; INTRAVENOUS at 09:11

## 2017-11-15 RX ADMIN — SODIUM CHLORIDE: 0.9 INJECTION, SOLUTION INTRAVENOUS at 07:11

## 2017-11-15 RX ADMIN — FENTANYL CITRATE 100 MCG: 50 INJECTION, SOLUTION INTRAMUSCULAR; INTRAVENOUS at 08:11

## 2017-11-15 RX ADMIN — OXYCODONE HYDROCHLORIDE 10 MG: 5 TABLET ORAL at 10:11

## 2017-11-15 RX ADMIN — ROCURONIUM BROMIDE 30 MG: 10 INJECTION, SOLUTION INTRAVENOUS at 08:11

## 2017-11-15 RX ADMIN — SODIUM CHLORIDE, SODIUM GLUCONATE, SODIUM ACETATE, POTASSIUM CHLORIDE, MAGNESIUM CHLORIDE, SODIUM PHOSPHATE, DIBASIC, AND POTASSIUM PHOSPHATE: .53; .5; .37; .037; .03; .012; .00082 INJECTION, SOLUTION INTRAVENOUS at 09:11

## 2017-11-15 RX ADMIN — ROCURONIUM BROMIDE 10 MG: 10 INJECTION, SOLUTION INTRAVENOUS at 08:11

## 2017-11-15 RX ADMIN — PROMETHAZINE HYDROCHLORIDE 25 MG: 25 INJECTION INTRAMUSCULAR; INTRAVENOUS at 08:11

## 2017-11-15 RX ADMIN — HYDROCODONE BITARTRATE AND ACETAMINOPHEN 1 TABLET: 5; 325 TABLET ORAL at 11:11

## 2017-11-15 RX ADMIN — Medication 2 G: at 08:11

## 2017-11-15 RX ADMIN — LIDOCAINE HYDROCHLORIDE 80 MG: 20 INJECTION, SOLUTION INTRAVENOUS at 08:11

## 2017-11-15 RX ADMIN — DEXAMETHASONE SODIUM PHOSPHATE 4 MG: 4 INJECTION, SOLUTION INTRAMUSCULAR; INTRAVENOUS at 08:11

## 2017-11-15 RX ADMIN — NEOSTIGMINE METHYLSULFATE 4 MG: 1 INJECTION INTRAVENOUS at 09:11

## 2017-11-15 RX ADMIN — BUPIVACAINE HYDROCHLORIDE 10 ML: 5 INJECTION, SOLUTION EPIDURAL; INTRACAUDAL; PERINEURAL at 09:11

## 2017-11-15 RX ADMIN — MIDAZOLAM HYDROCHLORIDE 2 MG: 1 INJECTION, SOLUTION INTRAMUSCULAR; INTRAVENOUS at 08:11

## 2017-11-15 RX ADMIN — ONDANSETRON 4 MG: 2 INJECTION INTRAMUSCULAR; INTRAVENOUS at 08:11

## 2017-11-15 NOTE — ANESTHESIA RELEASE NOTE
"Anesthesia Release from PACU Note    Patient: Polo Jones    Procedure(s) Performed: Procedure(s) (LRB):  REPAIR-HERNIA-INGUINAL-INITIAL (5 YRS+) Open Right with Mesh (Right)    Anesthesia type: general    Post pain: Adequate analgesia    Post assessment: no apparent anesthetic complications    Last Vitals:   Visit Vitals  BP (!) 147/85 (BP Location: Right arm, Patient Position: Lying)   Pulse 89   Temp 36.4 °C (97.6 °F) (Axillary)   Resp 20   Ht 5' 8" (1.727 m)   Wt 69.4 kg (153 lb)   SpO2 97%   BMI 23.26 kg/m²       Post vital signs: stable    Level of consciousness: awake    Nausea/Vomiting: no nausea/no vomiting    Complications: none    Airway Patency: patent    Respiratory: unassisted    Cardiovascular: stable and blood pressure at baseline    Hydration: euvolemic  "

## 2017-11-15 NOTE — OP NOTE
DATE OF PROCEDURE: 11/15/2017     PREOPERATIVE DIAGNOSIS: Right inguinal hernia.     POSTOPERATIVE DIAGNOSIS: Right inguinal hernia.     PROCEDURE PERFORMED: Right inguinal hernia repair with mesh.     ATTENDING SURGEON: Gianfranco Miles M.D.     HOUSESTAFF SURGEON: Lorne Oseguera M.D. (RES)     ANESTHESIA: Monitored anesthesia care plus local.     ESTIMATED BLOOD LOSS: 5 mL.     FINDINGS: Moderate-sized right inguinal hernia repaired with ProGrip mesh.    SPECIMEN: Hernia sac      DRAINS: None.     COMPLICATIONS: None.     INDICATIONS: Polo Jones is a 42 y.o.male referred to my General Surgery Clinic with a history of a symptomatic, reducible inguinal bulge. The history and exam were consistent with inguinal hernia. We recommended an open inguinal hernia repair with mesh and the patient agreed to proceed. The patient signed informed consent and expressed understanding of the risks and benefits of surgery.     DESCRIPTION OF OPERATIVE PROCEDURE: The patient was identified in preoperative holding and brought back to the Operating Room. The patient was placed supine on the operating table and padded appropriately. Monitors were applied and monitored anesthesia care was initiated. His right groin was shaved with electric clippers and prepped and draped in the standard sterile surgical fashion. A timeout was performed and all team members present agreed this was the correct procedure on the correct side of the correct patient. We also confirmed administration of appropriate preoperative antibiotics.     We marked out an appropriate right inguinal incision and a 4 cm oblique skin incision was made. Subcutaneous tissue was divided with Bovie electrocautery. This dissection was carried down through Priscilla fascia down to the aponeurosis of the external oblique. The aponeurosis of the external oblique was incised in line with its fibers, first with a scalpel and then Metzenbaum scissors, and this incision was extended to  the external spermatic ring. The inguinal canal contents were bluntly encircled, first digitally and then with a Penrose drain. A Yi retractor was used to facilitate the dissection. We proceeded to identify an indirect inguinal hernia sac, which was carefully dissected away from the inguinal canal contents. The sac was ligated with 2-0 Vicryl suture, as well as a large piece of preperitoneal fat. We performed skeletonization of the spermatic cord. We then had appropriate borders of the inguinal canal identified and decided to repair the defect with a piece of ProGrip mesh. The mesh was put in place appropriately and sewn in place with interrupted 2-0 Vicryl suture. Medially, the mesh was anchored to the fascia overlying the pubic tubercle. Inferiorly, the mesh was anchored to the shelving edge of the inguinal ligament. Superiorly, the mesh was anchored to the conjoined tendon. The tails of the mesh were brought together around the cord structures creating a new internal ring The mesh was inspected and noted to lie in appropriate position without any redundancy or tension. There was noted to be no tension on the cord structures. The aponeurosis of the external oblique was closed with a running 3-0 Vicryl suture. Priscilla fascia was closed with several buried interrupted 3-0 Vicryl sutures and the skin was closed with a running subcuticular Monocryl suture. A sterile dressing was applied. The patient was then transported to the Recovery Room in stable condition. All sponge, instrument and needle counts were correct at the end of the case. Dr. Miles was present and scrubbed for the entire procedure.

## 2017-11-15 NOTE — DISCHARGE INSTRUCTIONS
Hernia Repair Surgery  A hernia (or rupture) is a weakness or defect in the wall of the abdomen. A hernia will not heal on its own. Surgery is needed to repair the defect in the abdominal wall. If not treated, a hernia can get larger. It can also lead to serious health complications. Fortunately hernia surgery can be done quickly and safely. Below is an overview of hernia repair surgery.  Preparing for surgery  Your healthcare provider will talk with you about getting ready for surgery. Follow all the instructions youre given and be sure to:  · Tell your healthcare provider about any medicines, supplements, or herbs you take. This includes both prescription and over-the-counter items. Ask if you should stop taking any of them.  · Stop taking aspirin, ibuprofen, naproxen, and other NSAIDs 7 to 14 days before surgery.  · Arrange for an adult family member or friend to give you a ride home after surgery.  · Stop smoking. Smoking affects blood flow and can slow healing.  · Gently wash the surgical area the night before surgery.  · Follow any directions you are given for not eating or drinking before surgery.  The day of surgery  Arrive at the hospital or surgical center at your scheduled time. Youll be asked to change into a patient gown. Youll then be given an IV to provide fluids and medicine. Shortly before surgery, an anesthesiologist or nurse anesthetist will talk with you. He or she will explain the types of anesthesia used to prevent pain during surgery. You will have one or more of the following:  · Monitored sedation to make you relaxed and sleepy.  · Local anesthesia to numb the surgical site.  · Regional anesthesia to numb specific areas of your body.  · General anesthesia to let you sleep during surgery.  Fixing the weakness  Surgery treats a hernia by repairing the weakness in the abdominal wall. Most hernias are treated using tension-free repairs. This is surgery that uses special mesh materials  to repair the weak area. The mesh covers the weak area like a patch. The mesh is made of strong, flexible plastic that stays in the body. Over time, nearby tissues grow into the mesh to strengthen the repair.  After surgery  When the procedure is over, youll be taken to the recovery area to rest. Your blood pressure and heart rate will be monitored. Youll also have a bandage over the surgical site. To help reduce discomfort, youll be given pain medicines. You may also be given breathing exercises to keep your lungs clear. Later youll be asked to get up and walk. This helps prevent blood clots in the legs. You can go home when your healthcare provider says youre ready.     Risks and possible complications of hernia surgery  · Bleeding  · Infection  · Numbness or pain in the groin or leg  · Risk the hernia will recur  · Damage to the testicles or testicular function · Anesthesia risks  · Mesh complications  · Inability to urinate  · Bowel or bladder injury       Date Last Reviewed: 10/1/2016  © 6815-3564 [employer]. [address] [place] [address], [place] [address]. All rights reserved. This information is not intended as a substitute for professional medical care. Always follow your healthcare professional's instructions.

## 2017-11-15 NOTE — PLAN OF CARE
Patient attempting to urinate. Discharge instructions and RX given to patient's mother. Patient complaints of 7/10 pain. Eze VIRK notified and verbal obtained for norco 5 times 1. Will reassess for pain management.

## 2017-11-15 NOTE — TRANSFER OF CARE
"Anesthesia Transfer of Care Note    Patient: Polo Jones    Procedure(s) Performed: Procedure(s) (LRB):  REPAIR-HERNIA-INGUINAL-INITIAL (5 YRS+) Open Right with Mesh (Right)    Patient location: PACU    Anesthesia Type: general    Transport from OR: Transported from OR on 6-10 L/min O2 by face mask with adequate spontaneous ventilation    Post pain: adequate analgesia    Post assessment: no apparent anesthetic complications and tolerated procedure well    Post vital signs: stable    Level of consciousness: awake, alert and oriented    Nausea/Vomiting: no nausea/vomiting    Complications: none    Transfer of care protocol was followed      Last vitals:   Visit Vitals  BP (!) 154/88 (BP Location: Left arm, Patient Position: Lying)   Pulse 96   Temp 36.5 °C (97.7 °F) (Oral)   Resp 18   Ht 5' 8" (1.727 m)   Wt 69.4 kg (153 lb)   SpO2 100%   BMI 23.26 kg/m²     "

## 2017-11-15 NOTE — PLAN OF CARE
Pt tolerated procedure/anesthesia well, vss, no distress noted or reported, pain well controlled with meds, denies nausea, tolerated PO without difficulties, able to void without difficulties, discharge instructions reviewed with pt and family, verbalized understanding, consents with chart.

## 2017-11-15 NOTE — BRIEF OP NOTE
[facility]  Brief Operative Note     SUMMARY     Surgery Date: 11/15/2017     Surgeon(s) and Role:     * Gianfranco Miles MD - Primary     * Lorne Oseguera MD - Resident - Assisting    Pre-op Diagnosis:  Right inguinal hernia [K40.90]    Post-op Diagnosis:  Post-Op Diagnosis Codes:     * Right inguinal hernia [K40.90]    Procedure(s) (LRB):  REPAIR-HERNIA-INGUINAL-INITIAL (5 YRS+) Open Right with Mesh (Right)    Anesthesia: General    Description of the findings of the procedure: Right inguinal hernia repair with mesh.    Findings/Key Components: As above.     Estimated Blood Loss: 5 mL         Specimens:   Specimen (12h ago through future)    Start     Ordered    11/15/17 0901  Specimen to Pathology - Surgery  Once     Comments:  1. Rt inguinal Hernia sac-perm      11/15/17 0901          Discharge Note    SUMMARY     Admit Date: 11/15/2017    Discharge Date and Time:  11/15/2017 9:36 AM    Hospital Course (synopsis of major diagnoses, care, treatment, and services provided during the course of the hospital stay): Patient presented for right inguinal hernia repair, which he tolerated without issue. He was taken to PACU postoperatively and deemed safe for discharge home the same day.      Final Diagnosis: Post-Op Diagnosis Codes:     * Right inguinal hernia [K40.90]    Disposition: Home or Self Care    Follow Up/Patient Instructions:     Medications:  Reconciled Home Medications:   Current Discharge Medication List      START taking these medications    Details   hydrocodone-acetaminophen 5-325mg (NORCO) 5-325 mg per tablet Take 1-2 tablets by mouth every 4 to 6 hours as needed for Pain.  Qty: 21 tablet, Refills: 0             Discharge Procedure Orders  Diet general     Activity as tolerated     Lifting restrictions   Scheduling Instructions: No lifting objects heavier than 10 pounds for 6 weeks postoperatively.     Call MD for:  temperature >100.4     Call MD for:  persistent nausea and  vomiting or diarrhea     Call MD for:  severe uncontrolled pain     Call MD for:  redness, tenderness, or signs of infection (pain, swelling, redness, odor or green/yellow discharge around incision site)     Call MD for:  difficulty breathing or increased cough     Call MD for:  severe persistent headache     Remove dressing in 48 hours   Scheduling Instructions: May remove outer dressing in 48 hours. Underlying Steri Strips will remain in place for 7-10 days and fall off on their own. May shower in 48 hours with soap and water. Do not soak incision in standing water/tub/pool until skin incision is completely healed.       Follow-up Information     Gianfranco Miles MD In 2 weeks.    Specialty:  General Surgery  Why:  For postoperative follow up.  Contact information:  [facility] [address]  [facility] [address] [phone]

## 2017-11-15 NOTE — ANESTHESIA PREPROCEDURE EVALUATION
11/15/2017  Polo Jones is a 42 y.o., male.    Anesthesia Evaluation         Review of Systems  Anesthesia Hx:  No problems with previous Anesthesia   Social:  Non-Smoker, No Alcohol Use    Hematology/Oncology:     Oncology Normal     EENT/Dental:EENT/Dental Normal   Cardiovascular:  Cardiovascular Normal     Pulmonary:   Asthma    Musculoskeletal:   Right inguinal hernia       Physical Exam  General:  Well nourished    Airway/Jaw/Neck:  Airway Findings: Tongue: Normal General Airway Assessment: Adult  Mallampati: II  TM Distance: Normal, at least 6 cm      Dental:  Dental Findings: In tact        Mental Status:  Mental Status Findings:  Alert and Oriented, Cooperative         Anesthesia Plan  Type of Anesthesia, risks & benefits discussed:  Anesthesia Type:  general  Patient's Preference:   Intra-op Monitoring Plan: standard ASA monitors  Intra-op Monitoring Plan Comments:   Post Op Pain Control Plan:   Post Op Pain Control Plan Comments:   Induction:   IV  Beta Blocker:  Patient is not currently on a Beta-Blocker (No further documentation required).       Informed Consent: Patient understands risks and agrees with Anesthesia plan.  Questions answered. Anesthesia consent signed with patient.  ASA Score: 1     Day of Surgery Review of History & Physical:    H&P update referred to the surgeon.         Ready For Surgery From Anesthesia Perspective.

## 2017-11-20 ENCOUNTER — TELEPHONE (OUTPATIENT)
Dept: SURGERY | Facility: CLINIC | Age: 42
End: 2017-11-20

## 2017-11-20 RX ORDER — TRAMADOL HYDROCHLORIDE 50 MG/1
50 TABLET ORAL EVERY 4 HOURS PRN
COMMUNITY

## 2017-11-20 NOTE — TELEPHONE ENCOUNTER
Pt called to report that he has continued pain at his inguinal hernia repair site from 11/15/17.  He is out of [place].  Per Dr. Miles, Rx for Ultram called to pharmacy.  Pt aware.  He will f/u as scheduled on 11/28/17.

## 2017-11-28 ENCOUNTER — OFFICE VISIT (OUTPATIENT)
Dept: SURGERY | Facility: CLINIC | Age: 42
End: 2017-11-28
Payer: COMMERCIAL

## 2017-11-28 VITALS
DIASTOLIC BLOOD PRESSURE: 90 MMHG | BODY MASS INDEX: 23.04 KG/M2 | SYSTOLIC BLOOD PRESSURE: 151 MMHG | HEART RATE: 88 BPM | WEIGHT: 152 LBS | HEIGHT: 68 IN

## 2017-11-28 DIAGNOSIS — Z87.19 S/P RIGHT INGUINAL HERNIA REPAIR: Primary | ICD-10-CM

## 2017-11-28 DIAGNOSIS — Z98.890 S/P RIGHT INGUINAL HERNIA REPAIR: Primary | ICD-10-CM

## 2017-11-28 PROCEDURE — 99999 PR PBB SHADOW E&M-EST. PATIENT-LVL III: CPT | Mod: PBBFAC,,, | Performed by: PHYSICIAN ASSISTANT

## 2017-11-28 PROCEDURE — 99024 POSTOP FOLLOW-UP VISIT: CPT | Mod: S$GLB,,, | Performed by: PHYSICIAN ASSISTANT

## 2017-11-28 RX ORDER — IBUPROFEN 800 MG/1
800 TABLET ORAL 3 TIMES DAILY PRN
Qty: 21 TABLET | Refills: 0 | Status: SHIPPED | OUTPATIENT
Start: 2017-11-28 | End: 2017-12-05

## 2017-11-28 NOTE — PROGRESS NOTES
SUBJECTIVE:  The patient is a 42 y.o. y/o male 2 weeks s/p right inguinal hernia. He denies pain - has soreness, fevers, chills, nausea, vomiting, diarrhea, or constipation. Eating well with normal appetite and bowel function. Denies redness around or drainage from incisions.     OBJECTIVE:  GEN: male in NAD  ABD: soft, non-tender, non-distended  INCISIONS: clean, dry and intact, healing well without signs of infection or hernia    ASSESSMENT/PLAN:  Doing well 2 weeks s/p right inguinal hernia repair. Will send 800mg ibuprofen to pharmacy. Patient is advised to avoid heavy lifting or strenuous activity for another 2-4 weeks. May resume light cardio. Patient may bathe and continue to take a regular diet. Will follow-up with me on an as-needed basis. All questions answered; patient is comfortable with follow-up plan.

## 2024-04-29 ENCOUNTER — HOSPITAL ENCOUNTER (OUTPATIENT)
Dept: RADIOLOGY | Facility: HOSPITAL | Age: 49
Discharge: HOME | End: 2024-04-29
Payer: MEDICAID

## 2024-04-29 DIAGNOSIS — M54.2 CERVICALGIA: ICD-10-CM

## 2024-04-29 DIAGNOSIS — M54.40 LUMBAGO WITH SCIATICA: ICD-10-CM

## 2024-04-29 PROCEDURE — 72100 X-RAY EXAM L-S SPINE 2/3 VWS: CPT | Mod: TC,FY

## 2024-04-29 PROCEDURE — 72040 X-RAY EXAM NECK SPINE 2-3 VW: CPT | Mod: TC,FY

## 2024-04-29 PROCEDURE — 72040 X-RAY EXAM NECK SPINE 2-3 VW: CPT | Mod: 26,,, | Performed by: RADIOLOGY

## 2024-04-29 PROCEDURE — 72100 X-RAY EXAM L-S SPINE 2/3 VWS: CPT | Mod: 26,,, | Performed by: RADIOLOGY

## 2024-05-08 ENCOUNTER — PATIENT MESSAGE (OUTPATIENT)
Dept: RADIOLOGY | Facility: HOSPITAL | Age: 49
End: 2024-05-08
Payer: MEDICAID

## (undated) DEVICE — SEE MEDLINE ITEM 157148

## (undated) DEVICE — SEE MEDLINE ITEM 152622

## (undated) DEVICE — SUT VICRYL 3-0 27 SH

## (undated) DEVICE — SEE MEDLINE ITEM 146417

## (undated) DEVICE — SUT VICRYL CTD 2-0 GI 27 SH

## (undated) DEVICE — SEE MEDLINE ITEM 157117

## (undated) DEVICE — GOWN SURGICAL X-LARGE

## (undated) DEVICE — TRAY MINOR GEN SURG

## (undated) DEVICE — DRAIN PENROSE XRAY 12 X 1/4 ST

## (undated) DEVICE — SUT MCRYL PLUS 4-0 PS2 27IN

## (undated) DEVICE — DRESSING TRANS 4X4 TEGADERM

## (undated) DEVICE — APPLICATOR CHLORAPREP ORN 26ML

## (undated) DEVICE — SUT VICRYL PLUS 3-0 SH 18IN

## (undated) DEVICE — ADHESIVE MASTISOL VIAL 48/BX

## (undated) DEVICE — DRESSING TELFA STRL 4X3 LF

## (undated) DEVICE — ELECTRODE REM PLYHSV RETURN 9

## (undated) DEVICE — NDL HYPO REG 25G X 1 1/2

## (undated) DEVICE — SUT 2-0 VICRYL / CT-1

## (undated) DEVICE — DRESSING TEGADERM IV 3.5 X 4.5